# Patient Record
Sex: MALE | Race: WHITE | NOT HISPANIC OR LATINO
[De-identification: names, ages, dates, MRNs, and addresses within clinical notes are randomized per-mention and may not be internally consistent; named-entity substitution may affect disease eponyms.]

---

## 2017-01-04 ENCOUNTER — RX RENEWAL (OUTPATIENT)
Age: 67
End: 2017-01-04

## 2017-01-18 ENCOUNTER — APPOINTMENT (OUTPATIENT)
Dept: HEART AND VASCULAR | Facility: CLINIC | Age: 67
End: 2017-01-18

## 2017-01-18 VITALS
SYSTOLIC BLOOD PRESSURE: 98 MMHG | HEART RATE: 66 BPM | OXYGEN SATURATION: 95 % | DIASTOLIC BLOOD PRESSURE: 71 MMHG | RESPIRATION RATE: 15 BRPM

## 2018-02-08 ENCOUNTER — MEDICATION RENEWAL (OUTPATIENT)
Age: 68
End: 2018-02-08

## 2018-03-07 ENCOUNTER — APPOINTMENT (OUTPATIENT)
Dept: HEART AND VASCULAR | Facility: CLINIC | Age: 68
End: 2018-03-07

## 2018-12-12 ENCOUNTER — APPOINTMENT (OUTPATIENT)
Dept: HEART AND VASCULAR | Facility: CLINIC | Age: 68
End: 2018-12-12
Payer: COMMERCIAL

## 2018-12-12 VITALS
BODY MASS INDEX: 25.39 KG/M2 | WEIGHT: 215 LBS | DIASTOLIC BLOOD PRESSURE: 72 MMHG | HEIGHT: 77 IN | SYSTOLIC BLOOD PRESSURE: 116 MMHG | OXYGEN SATURATION: 98 % | HEART RATE: 59 BPM

## 2018-12-12 PROCEDURE — 99214 OFFICE O/P EST MOD 30 MIN: CPT

## 2018-12-12 NOTE — PHYSICAL EXAM
[General Appearance - Well Developed] : well developed [Normal Appearance] : normal appearance [Well Groomed] : well groomed [General Appearance - Well Nourished] : well nourished [No Deformities] : no deformities [General Appearance - In No Acute Distress] : no acute distress [Normal Conjunctiva] : the conjunctiva exhibited no abnormalities [Eyelids - No Xanthelasma] : the eyelids demonstrated no xanthelasmas [Normal Oral Mucosa] : normal oral mucosa [No Oral Pallor] : no oral pallor [No Oral Cyanosis] : no oral cyanosis [Normal Jugular Venous A Waves Present] : normal jugular venous A waves present [Normal Jugular Venous V Waves Present] : normal jugular venous V waves present [No Jugular Venous Amin A Waves] : no jugular venous amin A waves [Heart Rate And Rhythm] : heart rate and rhythm were normal [Heart Sounds] : normal S1 and S2 [Murmurs] : no murmurs present [Abdomen Soft] : soft [Abdomen Tenderness] : non-tender [Abdomen Mass (___ Cm)] : no abdominal mass palpated [Abnormal Walk] : normal gait [Gait - Sufficient For Exercise Testing] : the gait was sufficient for exercise testing [Nail Clubbing] : no clubbing of the fingernails [Cyanosis, Localized] : no localized cyanosis [Petechial Hemorrhages (___cm)] : no petechial hemorrhages [Skin Color & Pigmentation] : normal skin color and pigmentation [] : no rash [No Venous Stasis] : no venous stasis [Skin Lesions] : no skin lesions [No Skin Ulcers] : no skin ulcer [No Xanthoma] : no  xanthoma was observed [Oriented To Time, Place, And Person] : oriented to person, place, and time [Affect] : the affect was normal [Mood] : the mood was normal [No Anxiety] : not feeling anxious

## 2019-02-06 ENCOUNTER — APPOINTMENT (OUTPATIENT)
Dept: HEART AND VASCULAR | Facility: CLINIC | Age: 69
End: 2019-02-06
Payer: COMMERCIAL

## 2019-02-06 VITALS
SYSTOLIC BLOOD PRESSURE: 113 MMHG | WEIGHT: 216 LBS | HEART RATE: 55 BPM | OXYGEN SATURATION: 99 % | BODY MASS INDEX: 25.5 KG/M2 | HEIGHT: 77 IN | DIASTOLIC BLOOD PRESSURE: 70 MMHG

## 2019-02-06 PROCEDURE — 99214 OFFICE O/P EST MOD 30 MIN: CPT

## 2019-02-06 NOTE — PHYSICAL EXAM
[General Appearance - Well Developed] : well developed [Normal Appearance] : normal appearance [Well Groomed] : well groomed [General Appearance - Well Nourished] : well nourished [No Deformities] : no deformities [General Appearance - In No Acute Distress] : no acute distress [Normal Conjunctiva] : the conjunctiva exhibited no abnormalities [Eyelids - No Xanthelasma] : the eyelids demonstrated no xanthelasmas [Normal Oral Mucosa] : normal oral mucosa [No Oral Pallor] : no oral pallor [No Oral Cyanosis] : no oral cyanosis [Normal Jugular Venous A Waves Present] : normal jugular venous A waves present [Normal Jugular Venous V Waves Present] : normal jugular venous V waves present [No Jugular Venous Amin A Waves] : no jugular venous amin A waves [Respiration, Rhythm And Depth] : normal respiratory rhythm and effort [Exaggerated Use Of Accessory Muscles For Inspiration] : no accessory muscle use [Auscultation Breath Sounds / Voice Sounds] : lungs were clear to auscultation bilaterally [Heart Rate And Rhythm] : heart rate and rhythm were normal [Heart Sounds] : normal S1 and S2 [Murmurs] : no murmurs present [Abdomen Soft] : soft [Abdomen Tenderness] : non-tender [] : no hepato-splenomegaly [Abdomen Mass (___ Cm)] : no abdominal mass palpated [Abnormal Walk] : normal gait [Gait - Sufficient For Exercise Testing] : the gait was sufficient for exercise testing [Oriented To Time, Place, And Person] : oriented to person, place, and time [Affect] : the affect was normal [Mood] : the mood was normal [No Anxiety] : not feeling anxious

## 2019-02-22 ENCOUNTER — RX RENEWAL (OUTPATIENT)
Age: 69
End: 2019-02-22

## 2019-04-17 ENCOUNTER — RX CHANGE (OUTPATIENT)
Age: 69
End: 2019-04-17

## 2019-05-09 ENCOUNTER — LABORATORY RESULT (OUTPATIENT)
Age: 69
End: 2019-05-09

## 2019-05-09 ENCOUNTER — APPOINTMENT (OUTPATIENT)
Dept: HEMATOLOGY ONCOLOGY | Facility: CLINIC | Age: 69
End: 2019-05-09
Payer: COMMERCIAL

## 2019-05-09 VITALS
HEIGHT: 77 IN | TEMPERATURE: 98.3 F | DIASTOLIC BLOOD PRESSURE: 70 MMHG | SYSTOLIC BLOOD PRESSURE: 116 MMHG | BODY MASS INDEX: 24.79 KG/M2 | WEIGHT: 210 LBS | OXYGEN SATURATION: 98 % | HEART RATE: 81 BPM

## 2019-05-09 DIAGNOSIS — Z85.46 PERSONAL HISTORY OF MALIGNANT NEOPLASM OF PROSTATE: ICD-10-CM

## 2019-05-09 DIAGNOSIS — Z80.42 FAMILY HISTORY OF MALIGNANT NEOPLASM OF PROSTATE: ICD-10-CM

## 2019-05-09 DIAGNOSIS — Z82.49 FAMILY HISTORY OF ISCHEMIC HEART DISEASE AND OTHER DISEASES OF THE CIRCULATORY SYSTEM: ICD-10-CM

## 2019-05-09 DIAGNOSIS — Z80.3 FAMILY HISTORY OF MALIGNANT NEOPLASM OF BREAST: ICD-10-CM

## 2019-05-09 DIAGNOSIS — Z82.3 FAMILY HISTORY OF STROKE: ICD-10-CM

## 2019-05-09 DIAGNOSIS — Z82.0 FAMILY HISTORY OF EPILEPSY AND OTHER DISEASES OF THE NERVOUS SYSTEM: ICD-10-CM

## 2019-05-09 LAB
APTT BLD: 39 SEC
BASOPHILS # BLD AUTO: 0.06 K/UL
BASOPHILS NFR BLD AUTO: 0.9 %
EOSINOPHIL # BLD AUTO: 0.04 K/UL
EOSINOPHIL NFR BLD AUTO: 0.6 %
HCT VFR BLD CALC: 42 %
HGB BLD-MCNC: 13.3 G/DL
IMM GRANULOCYTES NFR BLD AUTO: 0.4 %
INR PPP: 1.61
LYMPHOCYTES # BLD AUTO: 1.3 K/UL
LYMPHOCYTES NFR BLD AUTO: 18.8 %
MAN DIFF?: NORMAL
MCHC RBC-ENTMCNC: 29.9 PG
MCHC RBC-ENTMCNC: 31.7 GM/DL
MCV RBC AUTO: 94.4 FL
MONOCYTES # BLD AUTO: 0.48 K/UL
MONOCYTES NFR BLD AUTO: 7 %
NEUTROPHILS # BLD AUTO: 4.99 K/UL
NEUTROPHILS NFR BLD AUTO: 72.3 %
PLATELET # BLD AUTO: 231 K/UL
PT BLD: 18.5 SEC
RBC # BLD: 4.45 M/UL
RBC # FLD: 13.7 %
WBC # FLD AUTO: 6.9 K/UL

## 2019-05-09 PROCEDURE — 36415 COLL VENOUS BLD VENIPUNCTURE: CPT

## 2019-05-09 PROCEDURE — 99244 OFF/OP CNSLTJ NEW/EST MOD 40: CPT | Mod: 25

## 2019-05-09 NOTE — REASON FOR VISIT
[Initial Consultation] : an initial consultation for [FreeTextEntry2] : History of DVT, Factor V Leiden mutation

## 2019-05-09 NOTE — ASSESSMENT
[FreeTextEntry1] : Patient is a 68 year old male with a history of aortic aneurysm, GERD, hyperlipidemia, prostate cancer in 2005, s/p surgical prostatectomy, Factor V Leiden mutation, and DVT of the left lower extremity, who comes for hematologic consultation regarding the need for continued anticoagulation. Patient has a strong family history of thrombosis and is documented to be heterozygous for Factor V Leiden mutation.  Have ordered CBC, INR, PTT, Lupus anticoagulant, DRVVT, cardiolipins panel, beta 2 glycoprotein 1 antibodies panel, homocysteine, protein C activity, protein S panel. Patient was advised to return to office discuss results.

## 2019-05-09 NOTE — HISTORY OF PRESENT ILLNESS
[de-identified] : Patient is a 68 year old male with a history of aortic aneurysm, GERD, hyperlipidemia, prostate cancer in 2005, s/p surgical prostatectomy, Factor V Leiden mutation, and DVT of the left lower extremity, who comes for hematologic consultation. In 2012 he developed a left lower extremity DVT, and was found at the time to be heterozygous for Factor V Leiden mutation, and negative for prothrombin gene mutation. The DVT was treated initially with Heparin and then Coumadin. Symptoms worsened while on Coumadin, and he was switched over to Enoxaparin injections, which he remained on for 6 years until his hematologist transitioned him over to Xarelto in January 2019. Family history is significant for thrombotic events in the patient's father, who was on Coumadin for many years, as well as his paternal aunt, uncle, and cousin. Of note, patient travelled frequently in the past, and although less often, still travels for lengthy periods on occasion. He states he is feeling generally well. Denies excessive bruising, any bleeding, fever, chills, or sweats.

## 2019-05-09 NOTE — CONSULT LETTER
[Dear  ___] : Dear  [unfilled], [Consult Letter:] : I had the pleasure of evaluating your patient, [unfilled]. [Please see my note below.] : Please see my note below. [( Thank you for referring [unfilled] for consultation for _____ )] : Thank you for referring [unfilled] for consultation for [unfilled] [Sincerely,] : Sincerely, [FreeTextEntry3] : Galilea Lantigua

## 2019-05-09 NOTE — END OF VISIT
[FreeTextEntry3] : All medical record entries made by the Scribe were at my, Dr. Galilea Lantigua, direction and personally dictated by me on 05/09/2019. I have reviewed the chart and agree that the record accurately reflects my personal performance of the history, physical exam, assessment and plan. I have also personally directed, reviewed, and agreed with the chart.

## 2019-05-09 NOTE — ADDENDUM
[FreeTextEntry1] : I, Kings Miranda, acted solely as a scribe for Dr. Galilea Lantigua on 05/09/2019.

## 2019-05-10 ENCOUNTER — LABORATORY RESULT (OUTPATIENT)
Age: 69
End: 2019-05-10

## 2019-05-10 ENCOUNTER — OTHER (OUTPATIENT)
Age: 69
End: 2019-05-10

## 2019-05-10 LAB
APTT HEX PL PPP: NEGATIVE
CARDIOLIPIN AB SER IA-ACNC: NEGATIVE
CONFIRM: 56.3 SEC
DRVVT IMM 1:2 NP PPP: ABNORMAL
DRVVT SCREEN TO CONFIRM RATIO: 1.1 RATIO
HCYS SERPL-MCNC: 18.4 UMOL/L
PROT C PPP CHRO-ACNC: 135 %
PROT S AG ACT/NOR PPP IA: 112 %
SCREEN DRVVT: 61.8 SEC

## 2019-05-13 LAB
B2 GLYCOPROT1 IGA SERPL IA-ACNC: <5 SAU
B2 GLYCOPROT1 IGG SER-ACNC: <5 SGU
B2 GLYCOPROT1 IGM SER-ACNC: <5 SMU
CARDIOLIPIN IGM SER-MCNC: 5.4 MPL
CARDIOLIPIN IGM SER-MCNC: <5 GPL
DEPRECATED CARDIOLIPIN IGA SER: <5 APL

## 2019-05-16 LAB — PROT S FREE PPP-ACNC: 140 % NORMAL

## 2019-06-04 ENCOUNTER — APPOINTMENT (OUTPATIENT)
Dept: HEMATOLOGY ONCOLOGY | Facility: CLINIC | Age: 69
End: 2019-06-04
Payer: COMMERCIAL

## 2019-06-04 VITALS
HEIGHT: 77 IN | BODY MASS INDEX: 24.79 KG/M2 | WEIGHT: 210 LBS | SYSTOLIC BLOOD PRESSURE: 110 MMHG | HEART RATE: 64 BPM | TEMPERATURE: 98.2 F | DIASTOLIC BLOOD PRESSURE: 60 MMHG | OXYGEN SATURATION: 98 %

## 2019-06-04 VITALS
SYSTOLIC BLOOD PRESSURE: 110 MMHG | BODY MASS INDEX: 24.79 KG/M2 | DIASTOLIC BLOOD PRESSURE: 60 MMHG | HEIGHT: 77 IN | WEIGHT: 210 LBS | OXYGEN SATURATION: 98 % | TEMPERATURE: 98.2 F | HEART RATE: 64 BPM

## 2019-06-04 PROCEDURE — 99212 OFFICE O/P EST SF 10 MIN: CPT

## 2019-06-04 NOTE — END OF VISIT
[FreeTextEntry3] : All medical record entries made by the Scribe were at my, Dr. Galilea Lantigua, direction and personally dictated by me on 06/04/2019. I have reviewed the chart and agree that the record accurately reflects my personal performance of the history, physical exam, assessment and plan. I have also personally directed, reviewed, and agreed with the chart.

## 2019-06-04 NOTE — ASSESSMENT
[FreeTextEntry1] : Patient is a 68 year old male with a history of aortic aneurysm, GERD, hyperlipidemia, prostate cancer in 2005, s/p surgical prostatectomy, Factor V Leiden mutation, and DVT of the left lower extremity, who comes for discussion of results. Patient has a strong family history of thrombosis and is documented to be heterozygous for Factor V Leiden mutation. Blood studies for hypercoagulable state were all negative or normal, with the exception of the positive lupus anticoagulant. Have advised that he remain on Xarelto for the time, and continued anticoagulation in the future will likely be required. Of note, patient has previously attempted Eliquis in the past, and subsequently developed a generalized rash. Have recommended that he also take OTC folic acid QD in view of his increased homocysteine. Have ordered B12 and folate, methylmalonic acid level, and molecular genetics MTHFR gene. He was made aware that there may exist thrombophilic states that, at this point in time, we do not have the capability for which to test. Patient was advised to call office to discuss results and next appointment date.

## 2019-06-04 NOTE — REASON FOR VISIT
[Follow-Up Visit] : a follow-up visit for [FreeTextEntry2] : Factor V Leiden Mutation, History of DVT, Hypercoagulable State

## 2019-06-04 NOTE — HISTORY OF PRESENT ILLNESS
[de-identified] : Patient is a 68 year old male with a history of aortic aneurysm, GERD, hyperlipidemia, prostate cancer in 2005, s/p surgical prostatectomy, Factor V Leiden mutation, and DVT of the left lower extremity, who comes for discussion of results. Patient had been on Enoxaparin injections for 6 years until his hematologist transitioned him over to Xarelto in January 2019. At the initial visit, patient's blood results revealed an increased homocysteine at 18.4. Blood studies for hypercoagulable state were all negative or normal, with the exception of the lupus anticoagulant which was positive. Family history is significant for thrombotic events in the patient's father, who was on Coumadin for many years, as well as his paternal aunt, uncle, and cousin. Patient is tolerating Xarelto. Previously tried Eliquis resulted in a diffuse rash. Patient states he is feeling generally well. Denies excessive bruising, bleeding, fever, chills, or sweats.

## 2019-06-04 NOTE — ADDENDUM
[FreeTextEntry1] : I, Kings Miranda, acted solely as a scribe for Dr. Galilea Lantigua on 06/04/2019.

## 2019-06-05 LAB
FOLATE SERPL-MCNC: 8.3 NG/ML
VIT B12 SERPL-MCNC: 301 PG/ML

## 2019-06-07 LAB — METHYLMALONATE SERPL-SCNC: 300 NMOL/L

## 2019-06-20 ENCOUNTER — OTHER (OUTPATIENT)
Age: 69
End: 2019-06-20

## 2019-07-17 ENCOUNTER — APPOINTMENT (OUTPATIENT)
Dept: HEART AND VASCULAR | Facility: CLINIC | Age: 69
End: 2019-07-17

## 2019-07-31 ENCOUNTER — APPOINTMENT (OUTPATIENT)
Dept: HEMATOLOGY ONCOLOGY | Facility: CLINIC | Age: 69
End: 2019-07-31

## 2020-01-09 ENCOUNTER — APPOINTMENT (OUTPATIENT)
Dept: HEMATOLOGY ONCOLOGY | Facility: CLINIC | Age: 70
End: 2020-01-09
Payer: COMMERCIAL

## 2020-01-09 VITALS
BODY MASS INDEX: 25.74 KG/M2 | DIASTOLIC BLOOD PRESSURE: 60 MMHG | OXYGEN SATURATION: 97 % | TEMPERATURE: 98.2 F | SYSTOLIC BLOOD PRESSURE: 110 MMHG | HEIGHT: 77 IN | WEIGHT: 218 LBS | HEART RATE: 53 BPM

## 2020-01-09 PROCEDURE — 99214 OFFICE O/P EST MOD 30 MIN: CPT | Mod: 25

## 2020-01-09 PROCEDURE — 36415 COLL VENOUS BLD VENIPUNCTURE: CPT

## 2020-01-09 NOTE — REASON FOR VISIT
[Follow-Up Visit] : a follow-up visit for [FreeTextEntry2] : History of DVT, Factor V Leiden Mutation, Hyperhomocystinemia, Positive Lupus anticoagulant, hypercoagulable state

## 2020-01-09 NOTE — HISTORY OF PRESENT ILLNESS
[de-identified] : Patient is a 69 year old male with a history of aortic aneurysm, GERD, hyperlipidemia, prostate cancer in 2005, s/p surgical prostatectomy, Factor V Leiden mutation, and DVT of the left lower extremity, who now presents for hematologic follow-up. Patient had been on Enoxaparin injections for 6 years (states that he was Coumadin resistant and had extension of clotting while on Coumadin), until his prior hematologist transitioned him over to Xarelto in January 2019. Patient is tolerating Xarelto. Patient states he is feeling generally well at this time, with no acute complaints. Denies excessive bruising, epistaxis, gingival bleeding, hematochezia, hematuria, fever, chills, or sweats. Of note, patient declined an influenza vaccination at today's visit.

## 2020-01-09 NOTE — END OF VISIT
[FreeTextEntry3] : All medical record entries made by the Scribe were at my, Dr. Galilea Lantigua, direction and personally dictated by me on 01/09/2020. I have reviewed the chart and agree that the record accurately reflects my personal performance of the history, physical exam, assessment and plan. I have also personally directed, reviewed, and agreed with the chart.

## 2020-01-09 NOTE — ADDENDUM
[FreeTextEntry1] : I, Uday Casper, acted solely as a scribe for Dr. Galilea Lantigua on 01/09/2020.

## 2020-01-09 NOTE — ASSESSMENT
[FreeTextEntry1] : Patient is a 69 year old male with a history of aortic aneurysm, GERD, hyperlipidemia, prostate cancer in 2005, s/p surgical prostatectomy, Factor V Leiden mutation, and DVT of the left lower extremity, who now presents for hematologic follow-up. Patient has a strong family history of thrombosis and has lupus anticoagulant and Factor V Leiden mutation. He has history of resistance to Coumadin and remains now on Xarelto. Have ordered CBC, CMP, B12 and folate, and homocysteine serum. Patient was advised to call office to discuss results and next appointment date.

## 2020-01-10 ENCOUNTER — LABORATORY RESULT (OUTPATIENT)
Age: 70
End: 2020-01-10

## 2020-01-10 LAB
ALBUMIN SERPL ELPH-MCNC: 4.4 G/DL
ALP BLD-CCNC: 76 U/L
ALT SERPL-CCNC: 12 U/L
ANION GAP SERPL CALC-SCNC: 13 MMOL/L
AST SERPL-CCNC: 20 U/L
BASOPHILS # BLD AUTO: 0.09 K/UL
BASOPHILS NFR BLD AUTO: 1.6 %
BILIRUB SERPL-MCNC: 0.5 MG/DL
BUN SERPL-MCNC: 29 MG/DL
CALCIUM SERPL-MCNC: 9.6 MG/DL
CHLORIDE SERPL-SCNC: 105 MMOL/L
CO2 SERPL-SCNC: 24 MMOL/L
CREAT SERPL-MCNC: 1.66 MG/DL
EOSINOPHIL # BLD AUTO: 0.08 K/UL
EOSINOPHIL NFR BLD AUTO: 1.4 %
FOLATE SERPL-MCNC: 19.5 NG/ML
GLUCOSE SERPL-MCNC: 118 MG/DL
HCT VFR BLD CALC: 42.9 %
HCYS SERPL-MCNC: 17.2 UMOL/L
HGB BLD-MCNC: 13.3 G/DL
IMM GRANULOCYTES NFR BLD AUTO: 0.4 %
LYMPHOCYTES # BLD AUTO: 1.45 K/UL
LYMPHOCYTES NFR BLD AUTO: 26.3 %
MAN DIFF?: NORMAL
MCHC RBC-ENTMCNC: 30 PG
MCHC RBC-ENTMCNC: 31 GM/DL
MCV RBC AUTO: 96.6 FL
MONOCYTES # BLD AUTO: 0.33 K/UL
MONOCYTES NFR BLD AUTO: 6 %
NEUTROPHILS # BLD AUTO: 3.55 K/UL
NEUTROPHILS NFR BLD AUTO: 64.3 %
PLATELET # BLD AUTO: 177 K/UL
POTASSIUM SERPL-SCNC: 4.3 MMOL/L
PROT SERPL-MCNC: 6.6 G/DL
RBC # BLD: 4.44 M/UL
RBC # FLD: 13.6 %
SODIUM SERPL-SCNC: 142 MMOL/L
VIT B12 SERPL-MCNC: 363 PG/ML
WBC # FLD AUTO: 5.52 K/UL

## 2020-01-22 ENCOUNTER — APPOINTMENT (OUTPATIENT)
Dept: OTOLARYNGOLOGY | Facility: CLINIC | Age: 70
End: 2020-01-22
Payer: COMMERCIAL

## 2020-01-22 VITALS
HEIGHT: 77 IN | DIASTOLIC BLOOD PRESSURE: 76 MMHG | BODY MASS INDEX: 25.62 KG/M2 | HEART RATE: 64 BPM | WEIGHT: 217 LBS | SYSTOLIC BLOOD PRESSURE: 121 MMHG

## 2020-01-22 PROCEDURE — 99213 OFFICE O/P EST LOW 20 MIN: CPT | Mod: 25

## 2020-01-22 PROCEDURE — 69210 REMOVE IMPACTED EAR WAX UNI: CPT

## 2020-01-22 NOTE — CONSULT LETTER
[FreeTextEntry2] : LATIA CHUNG\par  [FreeTextEntry1] : \par \par Dear  Dr. LATIA CHUNG,\par \par I had the pleasure of seeing your patient today.  \par Please see my note below.\par \par \par Thank you very much for allowing me to participate in the care of your patient.\par \par Sincerely,\par \par \par Anderson Mercedes MD\par [facility]\par [facility]\par  [facility]\par \par

## 2020-01-22 NOTE — PHYSICAL EXAM
[FreeTextEntry1] : \par The patient was alert and oriented and in no distress.\par Voice was clear.\par \par Face:\par The patient had no facial asymmetry or mass.\par The skin was unremarkable.\par \par Eyes:\par The pupils were equal round and reactive to light and accommodation.\par There was no significant nystagmus or disconjugate gaze noted.\par \par Nose: \par The external nose had no significant deformity.  There was no facial tenderness.  On anterior rhinoscopy, the nasal mucosa was clear.  The anterior septum was midline.  There were no visualized polyps purulence  or masses.\par \par Oral cavity:\par The oral mucosa was normal.\par The oral and base of tongue were clear and without mass.\par The gingival and buccal mucosa were moist and without lesions.\par The palate moved well.\par There was no cleft to the palate.\par There appeared to be good salivary flow.  \par There was no pus, erythema or mass in the oral cavity.\par \par Ears:\par Procedure note: Debridement of cerumen impaction left ear   59908\par \par Dx:  symptomatic cerumen impaction left ear \par Both external ears were normal.\par There was a dense cerumen impaction in the left ear.  This was cleared microscopically without trauma, using suction and curettes.  Beyond that, both ear canals were clear and both eardrums were intact and mobile.\par \par Neck: \par The neck was symmetrical.\par The parotid and submandibular glands were normal without masses.\par The trachea was midline and there was no unusual crepitus.\par The thyroid was smooth and nontender and no masses were palpated.\par There was no significant cervical adenopathy.\par \par \par Neuro:\par Neurologically, the patient was awake, alert, and oriented to person, place and time. There were no obvious focal neurologic abnormalities.  Cranial nerves II through XII were grossly intact.\par \par \par TMJ:\par The temporomandibular joints were nontender.\par There was no abnormal crepitus and no significant malocclusion\par

## 2020-01-22 NOTE — HISTORY OF PRESENT ILLNESS
[de-identified] : YUE HENSON is a 69 year old male who comes in complaining of A cerumen impaction in his left ear. I had seen him in the past with cerumen impactions tinnitus and also barotrauma. 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

## 2020-01-22 NOTE — ASSESSMENT
[FreeTextEntry1] : It was my impression that the patient had a cerumen impaction that was cleared.  I recommended topical moisturizing.  I recommended avoiding Q-tips.  I reviewed aural hygiene and the role of cerumen with the patient.  I suggested a repeat visit in a year or earlier should the need arise.\par He has a history of tinnitus but is not bothering him now.\par He feels his hearing was good and was not interested in having it rechecked today

## 2020-04-08 ENCOUNTER — APPOINTMENT (OUTPATIENT)
Dept: HEMATOLOGY ONCOLOGY | Facility: CLINIC | Age: 70
End: 2020-04-08

## 2020-07-21 ENCOUNTER — APPOINTMENT (OUTPATIENT)
Dept: HEMATOLOGY ONCOLOGY | Facility: CLINIC | Age: 70
End: 2020-07-21

## 2020-07-22 ENCOUNTER — APPOINTMENT (OUTPATIENT)
Dept: HEMATOLOGY ONCOLOGY | Facility: CLINIC | Age: 70
End: 2020-07-22
Payer: COMMERCIAL

## 2020-07-22 VITALS
OXYGEN SATURATION: 98 % | SYSTOLIC BLOOD PRESSURE: 118 MMHG | HEIGHT: 77 IN | HEART RATE: 81 BPM | DIASTOLIC BLOOD PRESSURE: 70 MMHG | WEIGHT: 223 LBS | BODY MASS INDEX: 26.33 KG/M2 | TEMPERATURE: 96.7 F

## 2020-07-22 DIAGNOSIS — Z11.59 ENCOUNTER FOR SCREENING FOR OTHER VIRAL DISEASES: ICD-10-CM

## 2020-07-22 PROCEDURE — 36415 COLL VENOUS BLD VENIPUNCTURE: CPT

## 2020-07-22 PROCEDURE — 99214 OFFICE O/P EST MOD 30 MIN: CPT | Mod: 25

## 2020-07-22 NOTE — REVIEW OF SYSTEMS
[Negative] : Allergic/Immunologic [Easy Bleeding] : no tendency for easy bleeding [Easy Bruising] : no tendency for easy bruising

## 2020-07-22 NOTE — REASON FOR VISIT
[Follow-Up Visit] : a follow-up visit for [FreeTextEntry2] : Hypercoagulable state, Factor V Leiden Mutation, Positive lupus anticoagulant, elevated homocysteine level, History of DVT, B12 deficiency

## 2020-07-22 NOTE — HISTORY OF PRESENT ILLNESS
[de-identified] : \par Patient is a 69 year old male with a history of aortic aneurysm, GERD, hyperlipidemia, prostate cancer in 2005, s/p surgical prostatectomy, Factor V Leiden mutation, and DVT of the left lower extremity, elevated homocysteine level, B12 deficiency  who now presents for hematologic follow-up. Patient had been on Enoxaparin injections for 6 years (states that he was Coumadin resistant and had extension of clotting while on Coumadin), until his prior hematologist transitioned him over to Xarelto in January 2019. Patient is tolerating Xarelto. Patient states he is feeling generally well at this time, with no acute complaints.Had some transient gum bleeding in early March and states a dental exam was unrevealing. Has small ecchymosis on toe from trauma. Denies excessive bruising, epistaxis, hematochezia, hematuria, fever, chills, or sweats. He has taken sublingual B12 and folic acid since last visit for low B12 and high homocysteine.

## 2020-07-22 NOTE — ASSESSMENT
[FreeTextEntry1] : \par Patient is a 69 year old male with a history of aortic aneurysm, GERD, hyperlipidemia, prostate cancer in 2005, s/p surgical prostatectomy, Factor V Leiden mutation, and DVT of the left lower extremity, elevated homocysteine level, B12 deficiency  who now presents for hematologic follow-up. Tolerating Xarelto without significant bleeding or bruising. Is deficient in B12 , has elevated homocysteine and has been taking supplements.\par Will order CBC, CMP, B12, folate, Homocysteine level and Covid-19 antibody (patient's request). To continue supplements. Patient advised to call next week to discuss results and next appointment date.

## 2020-07-22 NOTE — PHYSICAL EXAM
[Normal] : affect appropriate [de-identified] : small ecchymosis left third toe. [de-identified] : varicosities in the lower extremities

## 2020-07-23 ENCOUNTER — LABORATORY RESULT (OUTPATIENT)
Age: 70
End: 2020-07-23

## 2020-07-23 LAB
ALBUMIN SERPL ELPH-MCNC: 4.9 G/DL
ALP BLD-CCNC: 95 U/L
ALT SERPL-CCNC: 14 U/L
ANION GAP SERPL CALC-SCNC: 15 MMOL/L
AST SERPL-CCNC: 19 U/L
BASOPHILS # BLD AUTO: 0.07 K/UL
BASOPHILS NFR BLD AUTO: 1.2 %
BILIRUB SERPL-MCNC: 0.5 MG/DL
BUN SERPL-MCNC: 25 MG/DL
CALCIUM SERPL-MCNC: 9.9 MG/DL
CHLORIDE SERPL-SCNC: 101 MMOL/L
CO2 SERPL-SCNC: 23 MMOL/L
CREAT SERPL-MCNC: 1.7 MG/DL
EOSINOPHIL # BLD AUTO: 0.07 K/UL
EOSINOPHIL NFR BLD AUTO: 1.2 %
FOLATE SERPL-MCNC: >20 NG/ML
GLUCOSE SERPL-MCNC: 154 MG/DL
HCT VFR BLD CALC: 44.4 %
HGB BLD-MCNC: 14.2 G/DL
IMM GRANULOCYTES NFR BLD AUTO: 0.4 %
LYMPHOCYTES # BLD AUTO: 1.18 K/UL
LYMPHOCYTES NFR BLD AUTO: 21 %
MAN DIFF?: NORMAL
MCHC RBC-ENTMCNC: 30.2 PG
MCHC RBC-ENTMCNC: 32 GM/DL
MCV RBC AUTO: 94.5 FL
MONOCYTES # BLD AUTO: 0.38 K/UL
MONOCYTES NFR BLD AUTO: 6.8 %
NEUTROPHILS # BLD AUTO: 3.89 K/UL
NEUTROPHILS NFR BLD AUTO: 69.4 %
PLATELET # BLD AUTO: 181 K/UL
POTASSIUM SERPL-SCNC: 4.4 MMOL/L
PROT SERPL-MCNC: 7.2 G/DL
RBC # BLD: 4.7 M/UL
RBC # FLD: 13.9 %
SODIUM SERPL-SCNC: 139 MMOL/L
VIT B12 SERPL-MCNC: 485 PG/ML
WBC # FLD AUTO: 5.61 K/UL

## 2020-07-24 LAB — HCYS SERPL-MCNC: 16.7 UMOL/L

## 2020-07-29 LAB
SARS-COV-2 IGG SERPL IA-ACNC: <0.1 INDEX
SARS-COV-2 IGG SERPL QL IA: NEGATIVE

## 2020-12-21 ENCOUNTER — RX RENEWAL (OUTPATIENT)
Age: 70
End: 2020-12-21

## 2021-01-13 ENCOUNTER — NON-APPOINTMENT (OUTPATIENT)
Age: 71
End: 2021-01-13

## 2021-03-01 ENCOUNTER — RX RENEWAL (OUTPATIENT)
Age: 71
End: 2021-03-01

## 2021-03-08 ENCOUNTER — APPOINTMENT (OUTPATIENT)
Dept: OTOLARYNGOLOGY | Facility: CLINIC | Age: 71
End: 2021-03-08
Payer: COMMERCIAL

## 2021-03-08 VITALS — TEMPERATURE: 95.7 F | BODY MASS INDEX: 26.33 KG/M2 | HEIGHT: 77 IN | WEIGHT: 223 LBS

## 2021-03-08 PROCEDURE — 99213 OFFICE O/P EST LOW 20 MIN: CPT | Mod: 25

## 2021-03-08 PROCEDURE — 99072 ADDL SUPL MATRL&STAF TM PHE: CPT

## 2021-03-08 PROCEDURE — 69210 REMOVE IMPACTED EAR WAX UNI: CPT

## 2021-03-08 NOTE — CONSULT LETTER
[FreeTextEntry2] : LATIA CHUNG\par  [FreeTextEntry1] : \par \par Dear  Dr. LATIA CHUNG,\par \par I had the pleasure of seeing your patient today.  \par Please see my note below.\par \par \par Thank you very much for allowing me to participate in the care of your patient.\par \par Sincerely,\par \par \par Anderson Mercedes MD\par [facility]\par [facility]\par  [facility]\par \par

## 2021-03-08 NOTE — HISTORY OF PRESENT ILLNESS
[de-identified] : YUE HENSON is a 69 year old male who comes in complaining of A cerumen impaction in his left ear. I had seen him in the past with cerumen impactions tinnitus and also barotrauma. 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

## 2021-03-08 NOTE — ASSESSMENT
[FreeTextEntry1] : It was my impression that the patient had a cerumen impaction that was cleared.  I recommended topical moisturizing.  I recommended avoiding Q-tips.  I reviewed aural hygiene and the role of cerumen with the patient.  I suggested a repeat visit in a year or earlier should the need arise.\par He has a history of tinnitus but is not bothering him now.\par He feels his hearing was good and was not interested in having it rechecked today.  His last audiogram about a decade ago showed a mild snhl with slight asymmetry.

## 2021-05-06 ENCOUNTER — NON-APPOINTMENT (OUTPATIENT)
Age: 71
End: 2021-05-06

## 2021-05-07 ENCOUNTER — EMERGENCY (EMERGENCY)
Facility: HOSPITAL | Age: 71
LOS: 1 days | Discharge: HOME | End: 2021-05-07
Attending: EMERGENCY MEDICINE | Admitting: EMERGENCY MEDICINE
Payer: COMMERCIAL

## 2021-05-07 ENCOUNTER — NON-APPOINTMENT (OUTPATIENT)
Age: 71
End: 2021-05-07

## 2021-05-07 VITALS
TEMPERATURE: 98 F | DIASTOLIC BLOOD PRESSURE: 74 MMHG | HEART RATE: 65 BPM | RESPIRATION RATE: 18 BRPM | OXYGEN SATURATION: 98 % | SYSTOLIC BLOOD PRESSURE: 114 MMHG

## 2021-05-07 VITALS
RESPIRATION RATE: 18 BRPM | HEIGHT: 77 IN | DIASTOLIC BLOOD PRESSURE: 64 MMHG | WEIGHT: 218.04 LBS | HEART RATE: 64 BPM | TEMPERATURE: 98 F | OXYGEN SATURATION: 96 % | SYSTOLIC BLOOD PRESSURE: 101 MMHG

## 2021-05-07 DIAGNOSIS — Z79.01 LONG TERM (CURRENT) USE OF ANTICOAGULANTS: ICD-10-CM

## 2021-05-07 DIAGNOSIS — I82.402 ACUTE EMBOLISM AND THROMBOSIS OF UNSPECIFIED DEEP VEINS OF LEFT LOWER EXTREMITY: ICD-10-CM

## 2021-05-07 DIAGNOSIS — E78.5 HYPERLIPIDEMIA, UNSPECIFIED: ICD-10-CM

## 2021-05-07 DIAGNOSIS — I10 ESSENTIAL (PRIMARY) HYPERTENSION: ICD-10-CM

## 2021-05-07 LAB
ALBUMIN SERPL ELPH-MCNC: 4.2 G/DL — SIGNIFICANT CHANGE UP (ref 3.3–5)
ALP SERPL-CCNC: 85 U/L — SIGNIFICANT CHANGE UP (ref 40–120)
ALT FLD-CCNC: 10 U/L — SIGNIFICANT CHANGE UP (ref 10–45)
ANION GAP SERPL CALC-SCNC: 11 MMOL/L — SIGNIFICANT CHANGE UP (ref 5–17)
APTT BLD: 35.2 SEC — SIGNIFICANT CHANGE UP (ref 27.5–35.5)
AST SERPL-CCNC: 17 U/L — SIGNIFICANT CHANGE UP (ref 10–40)
BASOPHILS # BLD AUTO: 0.04 K/UL — SIGNIFICANT CHANGE UP (ref 0–0.2)
BASOPHILS NFR BLD AUTO: 0.7 % — SIGNIFICANT CHANGE UP (ref 0–2)
BILIRUB SERPL-MCNC: 0.4 MG/DL — SIGNIFICANT CHANGE UP (ref 0.2–1.2)
BUN SERPL-MCNC: 26 MG/DL — HIGH (ref 7–23)
CALCIUM SERPL-MCNC: 9.1 MG/DL — SIGNIFICANT CHANGE UP (ref 8.4–10.5)
CHLORIDE SERPL-SCNC: 105 MMOL/L — SIGNIFICANT CHANGE UP (ref 96–108)
CO2 SERPL-SCNC: 24 MMOL/L — SIGNIFICANT CHANGE UP (ref 22–31)
CREAT SERPL-MCNC: 1.57 MG/DL — HIGH (ref 0.5–1.3)
EOSINOPHIL # BLD AUTO: 0.04 K/UL — SIGNIFICANT CHANGE UP (ref 0–0.5)
EOSINOPHIL NFR BLD AUTO: 0.7 % — SIGNIFICANT CHANGE UP (ref 0–6)
GLUCOSE SERPL-MCNC: 105 MG/DL — HIGH (ref 70–99)
HCT VFR BLD CALC: 40.5 % — SIGNIFICANT CHANGE UP (ref 39–50)
HGB BLD-MCNC: 13 G/DL — SIGNIFICANT CHANGE UP (ref 13–17)
IMM GRANULOCYTES NFR BLD AUTO: 0.2 % — SIGNIFICANT CHANGE UP (ref 0–1.5)
INR BLD: 1.18 — HIGH (ref 0.88–1.16)
LYMPHOCYTES # BLD AUTO: 1.32 K/UL — SIGNIFICANT CHANGE UP (ref 1–3.3)
LYMPHOCYTES # BLD AUTO: 22.6 % — SIGNIFICANT CHANGE UP (ref 13–44)
MCHC RBC-ENTMCNC: 30.3 PG — SIGNIFICANT CHANGE UP (ref 27–34)
MCHC RBC-ENTMCNC: 32.1 GM/DL — SIGNIFICANT CHANGE UP (ref 32–36)
MCV RBC AUTO: 94.4 FL — SIGNIFICANT CHANGE UP (ref 80–100)
MONOCYTES # BLD AUTO: 0.37 K/UL — SIGNIFICANT CHANGE UP (ref 0–0.9)
MONOCYTES NFR BLD AUTO: 6.3 % — SIGNIFICANT CHANGE UP (ref 2–14)
NEUTROPHILS # BLD AUTO: 4.06 K/UL — SIGNIFICANT CHANGE UP (ref 1.8–7.4)
NEUTROPHILS NFR BLD AUTO: 69.5 % — SIGNIFICANT CHANGE UP (ref 43–77)
NRBC # BLD: 0 /100 WBCS — SIGNIFICANT CHANGE UP (ref 0–0)
PLATELET # BLD AUTO: 157 K/UL — SIGNIFICANT CHANGE UP (ref 150–400)
POTASSIUM SERPL-MCNC: 4.6 MMOL/L — SIGNIFICANT CHANGE UP (ref 3.5–5.3)
POTASSIUM SERPL-SCNC: 4.6 MMOL/L — SIGNIFICANT CHANGE UP (ref 3.5–5.3)
PROT SERPL-MCNC: 6.7 G/DL — SIGNIFICANT CHANGE UP (ref 6–8.3)
PROTHROM AB SERPL-ACNC: 14 SEC — HIGH (ref 10.6–13.6)
RBC # BLD: 4.29 M/UL — SIGNIFICANT CHANGE UP (ref 4.2–5.8)
RBC # FLD: 13.9 % — SIGNIFICANT CHANGE UP (ref 10.3–14.5)
SODIUM SERPL-SCNC: 140 MMOL/L — SIGNIFICANT CHANGE UP (ref 135–145)
WBC # BLD: 5.84 K/UL — SIGNIFICANT CHANGE UP (ref 3.8–10.5)
WBC # FLD AUTO: 5.84 K/UL — SIGNIFICANT CHANGE UP (ref 3.8–10.5)

## 2021-05-07 PROCEDURE — 99283 EMERGENCY DEPT VISIT LOW MDM: CPT

## 2021-05-07 PROCEDURE — 85025 COMPLETE CBC W/AUTO DIFF WBC: CPT

## 2021-05-07 PROCEDURE — 99284 EMERGENCY DEPT VISIT MOD MDM: CPT

## 2021-05-07 PROCEDURE — 80053 COMPREHEN METABOLIC PANEL: CPT

## 2021-05-07 PROCEDURE — 85610 PROTHROMBIN TIME: CPT

## 2021-05-07 PROCEDURE — 36415 COLL VENOUS BLD VENIPUNCTURE: CPT

## 2021-05-07 PROCEDURE — 85730 THROMBOPLASTIN TIME PARTIAL: CPT

## 2021-05-07 RX ORDER — ENOXAPARIN SODIUM 100 MG/ML
150 INJECTION SUBCUTANEOUS ONCE
Refills: 0 | Status: COMPLETED | OUTPATIENT
Start: 2021-05-07 | End: 2021-05-07

## 2021-05-07 RX ORDER — ENOXAPARIN SODIUM 100 MG/ML
150 INJECTION SUBCUTANEOUS
Qty: 14 | Refills: 0
Start: 2021-05-07 | End: 2021-05-20

## 2021-05-07 RX ORDER — APIXABAN 2.5 MG/1
0 TABLET, FILM COATED ORAL
Qty: 0 | Refills: 0 | DISCHARGE

## 2021-05-07 RX ADMIN — ENOXAPARIN SODIUM 150 MILLIGRAM(S): 100 INJECTION SUBCUTANEOUS at 16:48

## 2021-05-07 NOTE — ED PROVIDER NOTE - CLINICAL SUMMARY MEDICAL DECISION MAKING FREE TEXT BOX
71 y/o M w/ PMHx HTN, HLD, previous DVT in 2012, currently on xarelto, sent to the ED by his hematologist after new DVT found on outpt US. Will check labs, consult w/ Dr. Lantigua, and reassess. 69 y/o M w/ PMHx HTN, HLD, previous DVT in 2012, currently on xarelto, sent to the ED by his hematologist after new DVT found on outpt US. Will check labs, consult w/ Dr. Lantigua, and reassess.  Dr. Lantigua agrees on plan of restarting lovenox.  1.5mg/kg prescribed from Ed and for two weeks.  Will follow up as outpt with Dr. Lantigua.

## 2021-05-07 NOTE — ED PROVIDER NOTE - PATIENT PORTAL LINK FT
You can access the FollowMyHealth Patient Portal offered by [facility] by registering at the following website: [email or web address]. By joining [employer]’s FollowMyHealth portal, you will also be able to view your health information using other applications (apps) compatible with our system.

## 2021-05-07 NOTE — ED PROVIDER NOTE - NSFOLLOWUPINSTRUCTIONS_ED_ALL_ED_FT
Take lovenox as prescribed.    Follow up with your PMD and Dr. Lantigua next week.    Return to ED with worsening symptoms such as chest pain, shortness of breath, other concerns.          Deep Vein Thrombosis    WHAT YOU NEED TO KNOW:    Deep vein thrombosis (DVT) is a blood clot that forms in a deep vein of the body. The DVT can break into smaller pieces and travel to your lungs and cause a blockage called a pulmonary embolism. A PE can become life-threatening. It is important to go to all follow-up appointments and to take blood thinners as directed. This is especially important if you were discharged home from the emergency department.    Thrombus and Embolus         DISCHARGE INSTRUCTIONS:    Call your local emergency number (911 in the [place]) if:   •You feel lightheaded, short of breath, and have chest pain.      •You cough up blood.      Call your doctor if:   •Your arm or leg feels warm, tender, and painful. It may look swollen and red.      •You have questions or concerns about your condition or care.      Medicines:   •Blood thinners help prevent blood clots. Clots can cause strokes, heart attacks, and death. The following are general safety guidelines to follow while you are taking a blood thinner:?Watch for bleeding and bruising while you take blood thinners. Watch for bleeding from your gums or nose. Watch for blood in your urine and bowel movements. Use a soft washcloth on your skin, and a soft toothbrush to brush your teeth. This can keep your skin and gums from bleeding. If you shave, use an electric shaver. Do not play contact sports.       ?Tell your dentist and other healthcare providers that you take a blood thinner. Wear a bracelet or necklace that says you take this medicine.       ?Do not start or stop any other medicines unless your healthcare provider tells you to. Many medicines cannot be used with blood thinners.      ?Take your blood thinner exactly as prescribed by your healthcare provider. Do not skip does or take less than prescribed. Tell your provider right away if you forget to take your blood thinner, or if you take too much.      ?Warfarin is a blood thinner that you may need to take. The following are things you should be aware of if you take warfarin: ?Foods and medicines can affect the amount of warfarin in your blood. Do not make major changes to your diet while you take warfarin. Warfarin works best when you eat about the same amount of vitamin K every day. Vitamin K is found in green leafy vegetables and certain other foods. Ask for more information about what to eat when you are taking warfarin.      ?You will need to see your healthcare provider for follow-up visits when you are on warfarin. You will need regular blood tests. These tests are used to decide how much medicine you need.         •Take your medicine as directed. Contact your healthcare provider if you think your medicine is not helping or if you have side effects. Tell him or her if you are allergic to any medicine. Keep a list of the medicines, vitamins, and herbs you take. Include the amounts, and when and why you take them. Bring the list or the pill bottles to follow-up visits. Carry your medicine list with you in case of an emergency.      Manage a DVT:   •Wear pressure stockings as directed. The stockings put pressure on your legs. This improves blood flow and helps prevent clots. Wear the stockings during the day. Do not wear them when you sleep.  Pressure Stockings            •Elevate your legs above the level of your heart. Elevate your legs when you sit or lie down, as often as you can. This will help decrease swelling and pain. Prop your legs on pillows or blankets to keep them elevated comfortably.  Elevate Leg           Prevent a DVT:   •Exercise regularly to help increase your blood flow. Walking is a good low-impact exercise. Talk to your healthcare provider about the best exercise plan for you.   FAMILY WALKING FOR EXERCISE           •Change your body position or move around often. Move and stretch in your seat several times each hour if you travel by car or work at a desk. In an airplane, get up and walk every hour. Move your legs by tightening and releasing your leg muscles while sitting. You can move your legs while sitting by raising and lowering your heels. Keep your toes on the floor while you do this. You can also raise and lower your toes while keeping your heels on the floor.  DVT Prevention Heel Raise       DVT Prevention Toe Raise           •Maintain a healthy weight. Ask your healthcare provider what a healthy weight is for you. Ask him or her to help you create a weight loss plan if you are overweight.      •Do not smoke. Nicotine and other chemicals in cigarettes and cigars can damage blood vessels and make it more difficult to manage your DVT. Ask your healthcare provider for information if you currently smoke and need help to quit. E-cigarettes or smokeless tobacco still contain nicotine. Talk to your healthcare provider before you use these products.      •Ask about birth control if you are a woman who takes the pill. A birth control pill increases the risk for PE in certain women. The risk is higher if you are also older than 35, smoke cigarettes, or have a blood clotting disorder. Talk to your healthcare provider about other ways to prevent pregnancy, such as a cervical cap or intrauterine device (IUD).      Follow up with your doctor or specialist as directed: You may need to come in regularly for scans to check for blood clots. Your blood may be checked to see how long it takes to clot. Your doctor or specialist will tell you if you need to have this test and how often to have it. Write down your questions so you remember to ask them during your visits.       © Copyright [employer] 2021           back to top                          © Copyright [employer] 2021

## 2021-05-07 NOTE — ED PROVIDER NOTE - OBJECTIVE STATEMENT
71 y/o M with PMHx HTN, HLD, FHx of DVT, previous DVT in 2012, was on lovenox x 7 years, was switched to xarelto 20mg 1x a day for the past 2.5 years, sent in to the ED by his hematologist Dr. Collette Spaccavento for new DVT. Pt states he was seen by his podiatrist c/o swelling to his L foot as his foot felt tight in his shoe, but was told just to elevate the leg. Pt was then seen by his hematologist for a doppler, which showed a new L leg DVT and was sent here for further evaluation. Pt c/o mild swelling to the L ankle but states he feels otherwise feels well. Denies any calf pain, numbness, tingling, chest pain or SOB.

## 2021-05-07 NOTE — ED ADULT NURSE NOTE - OBJECTIVE STATEMENT
pt with confirmed left femoral DVT today , pt states that he noticed swelling in his left foot and was having some tightness in that area, hx DVT and on eliquis

## 2021-05-07 NOTE — ED PROVIDER NOTE - CARE PROVIDER_API CALL
Galilea Lantigua)  Hematology; Internal Medicine; Medical Oncology  [address], [place] [address]  Phone: [phone]  Fax: [phone]  Follow Up Time:

## 2021-05-07 NOTE — ED ADULT NURSE NOTE - NS PRO PASSIVE SMOKE EXP
OF SYSTEMS    (2-9 systems for level 4, 10 or more for level 5)     Review of Systems   Constitutional: Positive for fatigue. Negative for activity change, appetite change, chills, diaphoresis and fever. HENT: Negative for congestion, ear pain, postnasal drip, rhinorrhea, sinus pressure, sinus pain, sore throat and trouble swallowing. Eyes: Negative for visual disturbance. Respiratory: Negative for cough, chest tightness, shortness of breath and wheezing. Cardiovascular: Negative for chest pain and palpitations. Gastrointestinal: Negative for abdominal distention, abdominal pain, constipation, diarrhea, nausea and vomiting. Genitourinary: Negative for difficulty urinating, dysuria, flank pain, frequency and urgency. Musculoskeletal: Negative for arthralgias, back pain, myalgias, neck pain and neck stiffness. Skin: Negative for color change and rash. Neurological: Positive for weakness. Negative for dizziness, tremors, seizures, syncope, speech difficulty, light-headedness, numbness and headaches. Except as noted above the remainder of the review of systems was reviewed and negative.        PAST MEDICAL HISTORY     Past Medical History:   Diagnosis Date    Abnormality of gait and mobility     Adenocarcinoma of transverse colon ([facility] [address] 01/30/2018    Atrial fibrillation (HCC)     CAD (coronary artery disease)     Chronic renal disease, stage 3, moderately decreased glomerular filtration rate between 30-59 mL/min/1.73 square meter 1/20/2016    Depression     Eczema     Hematuria     Hyperlipidemia     Hypertension     Obesity 01/15/2018    BMI 40    Osteoarthritis     Severe nonproliferative diabetic retinopathy of both eyes ([address] 10/25/2017    Type II or unspecified type diabetes mellitus without mention of complication, not stated as uncontrolled     Varicose veins     Vascular dementia with depressed mood ([facility] [address] 3/10/2020    Villous adenoma of colon      Past Surgical History: Procedure Laterality Date    COLONOSCOPY  2009    Polyps    EYE SURGERY Left 2018    EYE SURGERY Left 2019    HYSTERECTOMY      INCISION AND DRAINAGE Right 2017    RIGHT ARM INCISION AND DRAINAGE performed by Cal Zheng MD at [address] [facility] FLX DX W/COLLJ [place] 1978 PFRMD N/A 1/10/2018    COLONOSCOPY performed by Nicky Morocho MD at [address] [facility] B W ANASTOMOSIS N/A 2018    RIGHT COLECTOMY for in situ cancer in polyp     Social History     Socioeconomic History    Marital status:      Spouse name: None    Number of children: 2    Years of education: 15    Highest education level: High school graduate   Occupational History     Employer: RETIRED   Social Needs    Financial resource strain: Not hard at all   [employer] insecurity     Worry: Never true     Inability: Never true   [address] needs     Medical: No     Non-medical: No   Tobacco Use    Smoking status: Former Smoker     Types: Cigarettes     Last attempt to quit: 1992     Years since quittin.4    Smokeless tobacco: Never Used    Tobacco comment: Quit 30 + years   Substance and Sexual Activity    Alcohol use: No     Comment: denies    Drug use: No    Sexual activity: Not Currently   Lifestyle    Physical activity     Days per week: 0 days     Minutes per session: 0 min    Stress: Not at all   Relationships    Social connections     Talks on phone: More than three times a week     Gets together: Twice a week     Attends L[employer]-day service: More than 4 times per year     Active member of club or organization: Yes     Attends meetings of clubs or organizations: More than 4 times per year     Relationship status:      Intimate partner violence     Fear of current or ex partner: None     Emotionally abused: None     Physically abused: None     Forced sexual activity: None   Other Topics Concern    None   Social History Narrative    The patient and Rhythm: Normal rate and regular rhythm. Pulses: Normal pulses. Pulmonary:      Effort: Pulmonary effort is normal.      Breath sounds: Normal breath sounds. Abdominal:      General: Bowel sounds are normal. There is no distension. Palpations: Abdomen is soft. Tenderness: There is no abdominal tenderness. Musculoskeletal: Normal range of motion. General: No tenderness or signs of injury. Skin:     General: Skin is warm and dry. Capillary Refill: Capillary refill takes less than 2 seconds. Neurological:      General: No focal deficit present. Mental Status: She is alert and oriented to person, place, and time. Mental status is at baseline. Cranial Nerves: No cranial nerve deficit. Sensory: No sensory deficit. Motor: No weakness.       Coordination: Coordination normal.         RESULTS     EKG: All EKG's are interpreted by the Emergency Department Physician who either signs or Co-signsthis chart in the absence of a cardiologist.        RADIOLOGY:   Nelly Mcintosh such as CT, Ultrasound and MRI are read by the radiologist. Plain radiographic images are visualized and preliminarily interpreted by the emergency physician with the below findings:        Interpretation per the Radiologist below, if available at the time ofthis note:    No orders to display         ED BEDSIDE ULTRASOUND:   Performed by ED Physician - none    LABS:  Labs Reviewed   CBC WITH AUTO DIFFERENTIAL - Abnormal; Notable for the following components:       Result Value    RBC 3.34 (*)     Hemoglobin 9.5 (*)     Hematocrit 29.6 (*)     MCHC 32.2 (*)     RDW 16.4 (*)     Platelets 593 (*)     Lymphocytes Absolute 0.7 (*)     All other components within normal limits   COMPREHENSIVE METABOLIC PANEL - Abnormal; Notable for the following components:    Glucose 129 (*)     BUN 52 (*)     CREATININE 1.78 (*)     GFR Non- 27.0 (*)     GFR  32.7 (*)     All other components within normal limits   URINE RT REFLEX TO CULTURE - Abnormal; Notable for the following components:    Leukocyte Esterase, Urine MODERATE (*)     All other components within normal limits   MICROSCOPIC URINALYSIS - Abnormal; Notable for the following components:    Bacteria, UA MODERATE (*)     WBC, UA 10-20 (*)     All other components within normal limits   POCT GLUCOSE - Abnormal; Notable for the following components:    POC Glucose 137 (*)     All other components within normal limits   POCT GLUCOSE - Abnormal; Notable for the following components:    POC Glucose 159 (*)     All other components within normal limits   POCT GLUCOSE - Abnormal; Notable for the following components:    POC Glucose 168 (*)     All other components within normal limits   POCT GLUCOSE - Normal   POCT GLUCOSE - Normal   CULTURE, URINE       All other labs were within normal range or not returned as of this dictation. EMERGENCY DEPARTMENT COURSE and DIFFERENTIAL DIAGNOSIS/MDM:   Vitals:    Vitals:    10/09/20 1749 10/09/20 1754 10/09/20 1908 10/09/20 2037   BP:  134/80 128/74 137/60   Pulse: 73  63 68   Resp: 19  20 18   Temp: 97.1 °F (36.2 °C)      TempSrc: Tympanic      SpO2: 95%  99% 100%   Weight: 216 lb (98 kg)      Height: 5' 1\" (1.549 m)               MDM patient presents afebrile nontoxic no acute distress hemodynamically stable alert and oriented x4. Patient states that at this time her only symptom is been a little tired which she states is normal for her when her blood sugar gets low. She states this is happened to her multiple times in the past admits that she did not have lunch today but did take all of her medications. She is shown improvement in her presentation there is no focal neurological deficits. Labs ordered for further evaluation. There is no leukocytosis patient does have a known history of renal disease and her metabolic panel is similar comparison to previous studies.   She does appear to have a urinary tract infection which she states does happened to her multiple times throughout the year. Given first dose of IV Rocephin in the ER. I have spoken the patient about outpatient treatment versus hospitalization. She states she does not want to be placed into the hospital and states that she feels much better and that all of her symptoms are similar to of the episode she had of low blood sugar. She is ambulatory with standby assist with a nurse to the bathroom without difficulty or incident. She states that she would prefer to be discharged home and does not believe she needs to stay in the hospital at this time. She has shown significant provement does appear stable for discharge home this time will be discharged additional antibiotic for the urinary tract infection. She states she does have a monitor at home and checks her blood glucose 3 times a day. She states she feels safer being discharged home and will follow-up with primary care provider soon as possible. Directed to return to the ER for any onset of new concerning symptoms worsening condition specifically onset of any notable weakness dizziness or even mild disorientation or onset of any illness symptoms. Patient verbalized understanding of all given instruction education. CRITICAL CARE TIME       CONSULTS:  None    PROCEDURES:  Unless otherwise noted below, none     Procedures    FINAL IMPRESSION      1. Hypoglycemia    2.  Acute cystitis without hematuria          DISPOSITION/PLAN   DISPOSITION        PATIENT REFERRED TO:  Carolina Arreola MD  [id number] [place] [phone]    Call in 1 day        DISCHARGE MEDICATIONS:  New Prescriptions    CEPHALEXIN (KEFLEX) 250 MG CAPSULE    Take 1 capsule by mouth 3 times daily for 7 days          (Please notethat portions of this note were completed with a voice recognition program.  Efforts were made to edit the dictations but occasionally words are mis-transcribed.)    CHARLIE Arriaza CNP (electronically signed)  Attending Emergency Physician         CHARLIE Arriaza CNP  10/09/20 9160 No

## 2021-05-07 NOTE — ED PROVIDER NOTE - MUSCULOSKELETAL, MLM
Spine appears normal, range of motion is not limited, 1+ edema to the L ankle, no calf tenderness, no muscle or joint tenderness, distal pulses intact.

## 2021-05-21 PROBLEM — I82.409 ACUTE EMBOLISM AND THROMBOSIS OF UNSPECIFIED DEEP VEINS OF UNSPECIFIED LOWER EXTREMITY: Chronic | Status: ACTIVE | Noted: 2021-05-07

## 2021-05-21 PROBLEM — E78.5 HYPERLIPIDEMIA, UNSPECIFIED: Chronic | Status: ACTIVE | Noted: 2021-05-09

## 2021-05-21 PROBLEM — I10 ESSENTIAL (PRIMARY) HYPERTENSION: Chronic | Status: ACTIVE | Noted: 2021-05-09

## 2021-05-26 ENCOUNTER — APPOINTMENT (OUTPATIENT)
Dept: HEMATOLOGY ONCOLOGY | Facility: CLINIC | Age: 71
End: 2021-05-26
Payer: COMMERCIAL

## 2021-05-26 VITALS
DIASTOLIC BLOOD PRESSURE: 80 MMHG | SYSTOLIC BLOOD PRESSURE: 122 MMHG | HEART RATE: 72 BPM | TEMPERATURE: 98.2 F | OXYGEN SATURATION: 99 % | WEIGHT: 220 LBS | BODY MASS INDEX: 25.98 KG/M2 | HEIGHT: 77 IN

## 2021-05-26 LAB
APTT BLD: 38.2 SEC
DEPRECATED D DIMER PPP IA-ACNC: <150 NG/ML DDU
INR PPP: 1.04
PT BLD: 12.4 SEC

## 2021-05-26 PROCEDURE — 99214 OFFICE O/P EST MOD 30 MIN: CPT | Mod: 25

## 2021-05-26 NOTE — REVIEW OF SYSTEMS
[Negative] : Allergic/Immunologic [Joint Pain] : no joint pain [Joint Stiffness] : no joint stiffness [Muscle Pain] : no muscle pain [Muscle Weakness] : no muscle weakness [Easy Bleeding] : no tendency for easy bleeding [Easy Bruising] : no tendency for easy bruising [FreeTextEntry9] : swelling left foot

## 2021-05-26 NOTE — ADDENDUM
[FreeTextEntry1] : I, Uday Casper, acted solely as a scribe for Dr. Galilea Lantigua on 05/26/2021.

## 2021-05-26 NOTE — REASON FOR VISIT
[Follow-Up Visit] : a follow-up visit for [FreeTextEntry2] : recurrent lower extremity deep vein thrombosis, hypercoagulable state, Factor V Leiden mutation

## 2021-05-26 NOTE — END OF VISIT
[FreeTextEntry3] : All medical record entries made by the Scribe were at my, Dr. Galilea Lantigua, direction and personally dictated by me on 05/26/2021. I have reviewed the chart and agree that the record accurately reflects my personal performance of the history, physical exam, assessment and plan. I have also personally directed, reviewed, and agreed with the chart.

## 2021-05-26 NOTE — HISTORY OF PRESENT ILLNESS
[de-identified] : Patient is a 70 year old male with a history of aortic aneurysm, GERD, hyperlipidemia, prostate cancer in 2005, s/p surgical prostatectomy, Factor V Leiden mutation, and DVT of the left lower extremity, elevated homocysteine level, B12 deficiency who now presents for hematologic follow-up after recent recurrent DVT in the left lower extremity.. He was last seen in office in June 2020. Patient's most recent blood results from early May 2021 revealed a hemoglobin of 13, hematocrit 40.5, red blood cell count of 42.9, and platelet count of 157,000. Prothrombin time was 14, INR was 1.18. Activated partial thromboplastin time was 35.2. Patient takes sublingual B12 for low B12, and wears compression socks on his lower extremities. Since the last visit, patient developed swelling in his left foot, for which he saw his PCP, who recommended a Doppler of the LLE be performed. The Doppler revealed a DVT from midthigh to knee, which was subsequently confirmed in the superficial and deep femoral vein in the [facility] ER. This is his third DVT in the LLE, which occurred on Xarelto, with the previous episode occurring 8 years ago. Patient was discharged from the ER on Lovenox, which he had previously been on for 7 years and tolerated well, but had discontinued in January 2019 to start Xarelto instead. Patient has been resistant to Coumadin in the past. He also does not tolerate Eliquis  (previously caused a pruritic rash). Patient states he is feeling generally well at this time, with no acute complaints except for mild residual swelling in the left foot. Denies pain in LLE, excessive bruising, any bleeding, recent infection, fever, chills, or sweats.

## 2021-05-26 NOTE — PHYSICAL EXAM
[Normal] : affect appropriate [de-identified] : varicosities in the lower extremities, left greater than right, mild swelling of the left foot, negative Alex's sign [de-identified] : no ecchymoses or petechiae

## 2021-05-27 LAB
ALBUMIN SERPL ELPH-MCNC: 4.7 G/DL
ALP BLD-CCNC: 84 U/L
ALT SERPL-CCNC: 24 U/L
ANION GAP SERPL CALC-SCNC: 12 MMOL/L
AST SERPL-CCNC: 32 U/L
BASOPHILS # BLD AUTO: 0.11 K/UL
BASOPHILS # BLD AUTO: 0.11 K/UL
BASOPHILS NFR BLD AUTO: 1.8 %
BASOPHILS NFR BLD AUTO: 1.8 %
BILIRUB SERPL-MCNC: 0.4 MG/DL
BUN SERPL-MCNC: 27 MG/DL
CALCIUM SERPL-MCNC: 9.5 MG/DL
CHLORIDE SERPL-SCNC: 107 MMOL/L
CO2 SERPL-SCNC: 23 MMOL/L
CREAT SERPL-MCNC: 1.61 MG/DL
EOSINOPHIL # BLD AUTO: 0 K/UL
EOSINOPHIL # BLD AUTO: 0 K/UL
EOSINOPHIL NFR BLD AUTO: 0 %
EOSINOPHIL NFR BLD AUTO: 0 %
GLUCOSE SERPL-MCNC: 79 MG/DL
HCT VFR BLD CALC: 44.2 %
HGB BLD-MCNC: 14.1 G/DL
LYMPHOCYTES # BLD AUTO: 0.71 K/UL
LYMPHOCYTES # BLD AUTO: 0.71 K/UL
LYMPHOCYTES NFR BLD AUTO: 11.8 %
LYMPHOCYTES NFR BLD AUTO: 11.8 %
MAN DIFF?: NORMAL
MCHC RBC-ENTMCNC: 30.4 PG
MCHC RBC-ENTMCNC: 31.9 GM/DL
MCV RBC AUTO: 95.3 FL
MONOCYTES # BLD AUTO: 0.17 K/UL
MONOCYTES # BLD AUTO: 0.17 K/UL
MONOCYTES NFR BLD AUTO: 2.8 %
MONOCYTES NFR BLD AUTO: 2.8 %
MSMEAR: NORMAL
NEUTROPHILS # BLD AUTO: 5 K/UL
NEUTROPHILS # BLD AUTO: 5 K/UL
NEUTROPHILS NFR BLD AUTO: 82.7 %
NEUTROPHILS NFR BLD AUTO: 82.7 %
NEUTS BAND NFR BLD MANUAL: 0.9 %
PLAT MORPH BLD: ABNORMAL
PLATELET # BLD AUTO: 190 K/UL
POIKILOCYTOSIS BLD QL SMEAR: SLIGHT
POTASSIUM SERPL-SCNC: 4.8 MMOL/L
PROT SERPL-MCNC: 7.1 G/DL
RBC # BLD: 4.64 M/UL
RBC # FLD: 14.2 %
RBC AGGLUT: PRESENT
RBC BLD AUTO: NORMAL
SMUDGE CELLS # BLD: PRESENT
SODIUM SERPL-SCNC: 142 MMOL/L
WBC # FLD AUTO: 5.98 K/UL

## 2021-05-28 LAB
FOLATE SERPL-MCNC: >20 NG/ML
VIT B12 SERPL-MCNC: 502 PG/ML

## 2021-06-22 ENCOUNTER — APPOINTMENT (OUTPATIENT)
Dept: VASCULAR SURGERY | Facility: CLINIC | Age: 71
End: 2021-06-22
Payer: COMMERCIAL

## 2021-06-22 PROCEDURE — 99203 OFFICE O/P NEW LOW 30 MIN: CPT

## 2021-06-22 PROCEDURE — 93971 EXTREMITY STUDY: CPT

## 2021-06-24 NOTE — HISTORY OF PRESENT ILLNESS
[FreeTextEntry1] : 70yoM w/previous h/o DVT in LLE 9y prior after frequent flights, referred by Dr. Lantigua for evaluation of the LLE for suspected recurrent DVT.  Pt states that he developed pain and tightness behind the L knee 9y prior after a long flight, was diagnosed w/fem-pop v DVT and was immediately started on Coumadin, but was later noted to be coumadin unresponsive.  Mr. Anne was later changed to Lovenox which he remained on for ~2y.  More recently, pt reported another episode of posterior knee pain and was restarted on Lovenox as duplex demonstrated recurrent fem-pop thrombus.\par \par Pt denies SOB/chest pain, and reports no issues w/bleeding/bruising w/current Lovenox use.

## 2021-06-24 NOTE — ASSESSMENT
[FreeTextEntry1] : 70yoM w/previous h/o DVT in LLE 9y prior after frequent flights, referred by Dr. Lantigua for evaluation of the LLE for suspected recurrent DVT.  Pt states that he developed pain and tightness behind the L knee 9y prior after a long flight, was diagnosed w/fem-pop v DVT and was immediately started on Coumadin, but was later noted to be coumadin unresponsive.  Mr. Anne was later changed to Lovenox which he remained on for ~2y.  More recently, pt reported another episode of posterior knee pain and was restarted on Lovenox as duplex demonstrated recurrent fem-pop thrombus.\par \par Slight discrepancy in limb girth noted in LEs (L>R), but L calf soft, non-tender and not tense w/o any palpable cords noted.  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  Reassured pt that there is no acute thrombus present that would necessitate full anticoagulation at this time, but would recommend daily compression stocking use for treatment of his LLE edema, and encourage elevation/exercise/hydration to prevent recurrent DVT.\par \par I, Dr. Annika Yo, personally performed the evaluation and management (E/M) services for this new patient.  That E/M includes conducting the initial examination, assessing all conditions, and establishing the plan of care.  Today, ACP, Venu Cardoso, was here to observe my evaluation and management services for this patient to be followed going forward.

## 2021-06-24 NOTE — ADDENDUM
[FreeTextEntry1] : This note was written by Venu Reddy, acting as a scribe for Dr. Annika Yo.  I, Dr. Annika Yo, have read and attest that all the information, medical decision-making, and discharge instructions within are true and accurate.\par \par I, Dr. Annika Yo, personally performed the evaluation and management (E/M) services for this new patient.  That E/M includes conducting the initial examination, assessing all conditions, and establishing the plan of care.  Today, my ACP, Venu Reddy, was here to observe my evaluation and management services for this patient to be followed going forward.

## 2021-06-24 NOTE — PROCEDURE
[FreeTextEntry1] : Duplex performed today to assess for acute LLE thrombus demonstrates chronic CFV-fem v-pop v DVT w/CFV scarring and partial recanalization of the pop v, no evidence of acute LLE DVT/SVT

## 2021-06-24 NOTE — PHYSICAL EXAM
[Normal Thyroid] : the thyroid was normal [Normal Breath Sounds] : Normal breath sounds [Normal Heart Sounds] : normal heart sounds [Normal Rate and Rhythm] : normal rate and rhythm [2+] : left 2+ [Ankle Swelling (On Exam)] : present [Ankle Swelling On The Right] : mild [Ankle Swelling On The Left] : of the left ankle [No HSM] : no hepatosplenomegaly [No Rash or Lesion] : No rash or lesion [Alert] : alert [Calm] : calm [JVD] : no jugular venous distention  [Carotid Bruits] : no carotid bruits [Right Carotid Bruit] : no bruit heard over the right carotid [Left Carotid Bruit] : no bruit heard over the left carotid [Varicose Veins Of Lower Extremities] : not present [] : not present [Abdomen Masses] : No abdominal masses [Abdomen Tenderness] : ~T ~M No abdominal tenderness [Purpura] : no purpura  [Petechiae] : no petechiae [Skin Ulcer] : no ulcer [Skin Induration] : no induration [de-identified] : Well-nourished, NAD [de-identified] : NC/AT, anicteric [de-identified] : FROM throughout, strength 5/5x4, no palpable cords in LLE [de-identified] : Neurosensory grossly intact in LEs

## 2021-10-22 ENCOUNTER — APPOINTMENT (OUTPATIENT)
Dept: OTOLARYNGOLOGY | Facility: CLINIC | Age: 71
End: 2021-10-22
Payer: MEDICARE

## 2021-10-22 VITALS — WEIGHT: 220 LBS | TEMPERATURE: 96.7 F | HEIGHT: 77 IN | BODY MASS INDEX: 25.98 KG/M2

## 2021-10-22 DIAGNOSIS — H93.19 TINNITUS, UNSPECIFIED EAR: ICD-10-CM

## 2021-10-22 DIAGNOSIS — H61.22 IMPACTED CERUMEN, LEFT EAR: ICD-10-CM

## 2021-10-22 PROCEDURE — 69210 REMOVE IMPACTED EAR WAX UNI: CPT

## 2021-10-22 PROCEDURE — 99213 OFFICE O/P EST LOW 20 MIN: CPT | Mod: 25

## 2021-10-22 RX ORDER — OMEPRAZOLE 40 MG/1
40 CAPSULE, DELAYED RELEASE ORAL
Qty: 90 | Refills: 3 | Status: COMPLETED | COMMUNITY
Start: 2019-04-17 | End: 2021-10-22

## 2021-10-22 RX ORDER — RIVAROXABAN 20 MG/1
20 TABLET, FILM COATED ORAL
Qty: 30 | Refills: 0 | Status: COMPLETED | COMMUNITY
Start: 2019-03-22 | End: 2021-10-22

## 2021-10-22 RX ORDER — RIVAROXABAN 20 MG/1
20 TABLET, FILM COATED ORAL
Qty: 90 | Refills: 3 | Status: COMPLETED | COMMUNITY
Start: 2020-01-09 | End: 2021-10-22

## 2021-10-22 RX ORDER — RIVAROXABAN 20 MG/1
20 TABLET, FILM COATED ORAL
Qty: 90 | Refills: 0 | Status: COMPLETED | COMMUNITY
Start: 2019-06-20 | End: 2021-10-22

## 2021-10-22 NOTE — ASSESSMENT
[FreeTextEntry1] : It was my impression that the patient had a cerumen impaction that was cleared.  I recommended topical moisturizing.  I recommended avoiding Q-tips.  I reviewed aural hygiene and the role of cerumen with the patient.  I suggested a repeat visit in a year or earlier should the need arise.\par He has a history of tinnitus but is not bothering him now.\par He feels his hearing was good and was not interested in having it rechecked today.  His last audiogram about a decade ago showed a mild snhl with slight asymmetry.\par Mucosal obstruction in the septal deflection and for which she did not want intervention. He uses alcohol he finds helps.

## 2021-10-22 NOTE — HISTORY OF PRESENT ILLNESS
[de-identified] : YUE HENSON is a 71 year old male who comes in complaining of A cerumen impaction in his left ear. I had seen him in the past with cerumen impactions tinnitus and also barotrauma. He also complains of nasal congestion, helped by alkalol.  He is a musician.The patient had no other ear nose or throat complaints at this visit.

## 2021-10-22 NOTE — CONSULT LETTER
[FreeTextEntry2] : LATIA CHUNG\par  [FreeTextEntry1] : \par \par Dear  Dr. LATIA CHUNG,\par \par I had the pleasure of seeing your patient today.  \par Please see my note below.\par \par \par Thank you very much for allowing me to participate in the care of your patient.\par \par Sincerely,\par \par \par Anderson Mercedes MD\par [facility]\par [facility]\par  [facility]\par \par

## 2021-10-22 NOTE — PHYSICAL EXAM
[FreeTextEntry1] : General:\par The patient was alert and oriented and in no distress.\par Voice was clear.\par \par Face:\par The patient had no facial asymmetry or mass.\par The skin was unremarkable.\par \par Ears:\par Procedure note: Debridement of cerumen impaction left ear   43661\par \par Dx:  symptomatic cerumen impaction left ear \par Both external ears were normal.\par There was a dense cerumen impaction in the left ear.  This was cleared microscopically without trauma, using suction and curettes.  Beyond that, both ear canals were clear and both eardrums were intact and mobile.\par \par Nose: \par The external nose had no significant deformity.  There was no facial tenderness.  On anterior rhinoscopy, the nasal mucosa was clear.  The anterior septum was midline.  There were no visualized polyps purulence  or masses. There is a significant right septal deflection more posteriorly\par \par Oral cavity:\par The oral mucosa was normal.\par The oral and base of tongue were clear and without mass.\par The gingival and buccal mucosa were moist and without lesions.\par The palate moved well.\par There was no cleft to the palate.\par There appeared to be good salivary flow.  \par There was no pus, erythema or mass in the oral cavity.\par \par Neck: \par The neck was symmetrical.\par The parotid and submandibular glands were normal without masses.\par The trachea was midline and there was no unusual crepitus.\par The thyroid was smooth and nontender and no masses were palpated.\par There was no significant cervical adenopathy.\par \par Face:\par The patient had no facial asymmetry or mass.\par The skin was unremarkable.\par \par Neuro:\par Neurologically, the patient was awake, alert, and oriented to person, place and time. There were no obvious focal neurologic abnormalities.  Cranial nerves II through XII were grossly intact.

## 2022-02-08 NOTE — ED ADULT NURSE NOTE - NS ED NURSE LEVEL OF CONSCIOUSNESS ORIENTATION
February 8, 2022      Gabe Nazario       [id number] [facility] [id number]      Dear Gabe,    There’s no question about it – preventive care can save lives or can help stop a disease process in its tracks. Many health problems start out silently without symptoms. Preventive care is often the only way to catch these problems in early stages, when they can be more successfully treated.    Our records show that you are due for the following tests or immunizations. If you have had these tests or immunizations done, please call us so we can update your record.    · Colonoscopy: Colorectal cancer usually comes from polyps (abnormal growths) in the colon or rectum. Colonoscopy can find the polyps and remove them before they turn to cancer. To schedule your Colonoscopy, please call: [phone]       Your good health is important to us. Please feel free to call my office at [phone] with any questions.          Sincerely,         Miranda Schumacher MD  [id number]  [facility] [address]  [phone]     Enclosures:        [employer] offers online access to your health information via [email or web address] .   By registering for [employer] you will be able to view test results, pay your bill, send messages to your care team (not for immediate response), refill prescriptions, schedule and verify appointments and much more. You may also download the [employer] jordi from the [employer] Store or Google Play to access your   health information.    
Oriented - self; Oriented - place; Oriented - time

## 2022-02-25 ENCOUNTER — NON-APPOINTMENT (OUTPATIENT)
Age: 72
End: 2022-02-25

## 2022-03-08 ENCOUNTER — NON-APPOINTMENT (OUTPATIENT)
Age: 72
End: 2022-03-08

## 2022-03-22 ENCOUNTER — APPOINTMENT (OUTPATIENT)
Dept: VASCULAR SURGERY | Facility: CLINIC | Age: 72
End: 2022-03-22
Payer: MEDICARE

## 2022-03-22 PROCEDURE — 99213 OFFICE O/P EST LOW 20 MIN: CPT

## 2022-03-22 PROCEDURE — 93971 EXTREMITY STUDY: CPT

## 2022-03-24 NOTE — HISTORY OF PRESENT ILLNESS
[FreeTextEntry1] : 71yoM w/previous h/o DVT in LLE 10y prior after frequent flights, followed by Dr. Lantigua and referred back for evaluation of his LLE in the setting of a recent fall w/trauma to the L hip and knee, now w/dull achy pain throughout the leg.  Pt is back on Lovenox 150mg SQ qd w/o issue.\par \par Pt denies SOB/chest pain, and reports no issues w/bleeding/bruising w/current Lovenox use.

## 2022-03-24 NOTE — ADDENDUM
[FreeTextEntry1] : This note was written by Venu Reddy, acting as a scribe for Dr. Annika Yo.  I, Dr. Annika Yo, have read and attest that all the information, medical decision-making, and discharge instructions within are true and accurate.\par \par I, Dr. Annika Yo, personally performed the evaluation and management (E/M) services for this established patient who presents today with (an) existing condition(s).  That E/M includes conducting the examination, assessing all conditions, and (re)establishing/reinforcing a plan of care.  Today, my ACP, Venu Reddy, was here to observe my evaluation and management services for this condition to be followed going forward.

## 2022-03-24 NOTE — PHYSICAL EXAM
[Normal Thyroid] : the thyroid was normal [Normal Breath Sounds] : Normal breath sounds [Normal Heart Sounds] : normal heart sounds [Normal Rate and Rhythm] : normal rate and rhythm [2+] : left 2+ [Ankle Swelling (On Exam)] : present [Ankle Swelling On The Left] : of the left ankle [Ankle Swelling On The Right] : mild [No HSM] : no hepatosplenomegaly [No Rash or Lesion] : No rash or lesion [Alert] : alert [Calm] : calm [JVD] : no jugular venous distention  [Carotid Bruits] : no carotid bruits [Right Carotid Bruit] : no bruit heard over the right carotid [Left Carotid Bruit] : no bruit heard over the left carotid [Varicose Veins Of Lower Extremities] : not present [] : not present [Abdomen Masses] : No abdominal masses [Abdomen Tenderness] : ~T ~M No abdominal tenderness [Purpura] : no purpura  [Petechiae] : no petechiae [Skin Ulcer] : no ulcer [de-identified] : Well-nourished, NAD [Skin Induration] : no induration [de-identified] : NC/AT, anicteric [de-identified] : FROM throughout, strength 5/5x4, no palpable cords in LLE [de-identified] : Neurosensory grossly intact in LEs

## 2022-03-24 NOTE — ASSESSMENT
[FreeTextEntry1] : 71yoM w/previous h/o DVT in LLE 10y prior after frequent flights, followed by Dr. Lantigua and referred back for evaluation of his LLE in the setting of a recent fall w/trauma to the L hip and knee, now w/dull achy pain throughout the leg.  Pt is back on Lovenox 150mg SQ qd w/o issue.\par \par Slight chronic discrepancy in limb girth noted in LEs (L>R), but L calf soft, non-tender and not tense w/o any palpable cords noted.  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  Reassured pt that there is no acute thrombus, hematoma, or pseudoaneurysm  present at this time.  Recommend daily compression, elevation, and exercise to control his edema.\par \par I, Dr. Yo, personally performed the evaluation and management (E/M) services for this established patient who presents today with (a) new problem(s)/exacerbation of (an) existing condition(s).  That E/M includes conducting the examination, assessing all new/exacerbated conditions, and establishing a new plan of care.  Today, ACP, Venu Cardoso, was here to observe my evaluation and management services for this new problem/exacerbated condition to be followed going forward.

## 2022-05-19 ENCOUNTER — APPOINTMENT (OUTPATIENT)
Dept: HEMATOLOGY ONCOLOGY | Facility: CLINIC | Age: 72
End: 2022-05-19
Payer: MEDICARE

## 2022-05-19 VITALS
DIASTOLIC BLOOD PRESSURE: 68 MMHG | OXYGEN SATURATION: 97 % | HEIGHT: 77 IN | TEMPERATURE: 95.5 F | BODY MASS INDEX: 25.74 KG/M2 | SYSTOLIC BLOOD PRESSURE: 118 MMHG | WEIGHT: 218 LBS | HEART RATE: 66 BPM

## 2022-05-19 LAB
APTT BLD: 39 SEC
INR PPP: 1.05
PT BLD: 12.5 SEC

## 2022-05-19 PROCEDURE — 36415 COLL VENOUS BLD VENIPUNCTURE: CPT

## 2022-05-19 PROCEDURE — 99214 OFFICE O/P EST MOD 30 MIN: CPT | Mod: 25

## 2022-05-19 NOTE — HISTORY OF PRESENT ILLNESS
[de-identified] : \par Patient is a 71 year old male with a history of aortic aneurysm, GERD, hyperlipidemia, prostate cancer in 2005, s/p surgical prostatectomy, Factor V Leiden mutation, and DVT of the left lower extremity, elevated homocysteine level, B12 deficiency who now presents for hematologic follow-up.. He was last seen in office in May 2021 after he developed left lower extremity pain and the Doppler revealed a DVT from midthigh to knee, which was subsequently confirmed in the superficial and deep femoral vein in the [facility] ER. This was his third DVT in the LLE, which occurred while on Xarelto, with the previous episode occurring 8 years prior. Patient was discharged from the ER on Lovenox, which he had previously been on for 7 years and tolerated well, but had discontinued in January 2019 to start Xarelto instead. Patient has been resistant to Coumadin in the past. He also does not tolerate Eliquis (previously caused a pruritic rash).  Patient had fallen on the ice this past winter with injury to his left leg.  Repeat evaluation did not reveal any thrombus or hematoma.  He follows with , vascular surgeon.  Patient states he is feeling generally well at this time, with no acute complaints.  He recently saw his PCP and underwent an ultrasound of the kidneys and bladder for follow-up of his renal insufficiency.  Patient states that all was stable.  The patient has not yet had a bone density as recommended after his last visit a year ago.  Denies pain in LLE, excessive bruising, any bleeding, recent infection, fever, chills, or sweats. \par

## 2022-05-19 NOTE — ASSESSMENT
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 Recommended that patient again obtain a requisition from his PCP for testing his bone density, as his long term use of Lovenox places him at higher risk of developing osteoporosis. He will continue taking sublingual B12. Have ordered CBC, CMP,  PT/INR, and APTT , B12 and folate.  Venipuncture was performed in the office today.  Lovenox dosage adjustment to be considered due to patient's age and pending renal function. Patient advised to call next week to discuss results and next appointment date. \par \par

## 2022-05-19 NOTE — PHYSICAL EXAM
[Normal] : affect appropriate [de-identified] : varicosities in the lower extremities [de-identified] : no ecchymoses or petechiae

## 2022-05-19 NOTE — REASON FOR VISIT
[Follow-Up Visit] : a follow-up visit for [FreeTextEntry2] : Recurrent lower extremity DVT, factor V Leiden mutation, hypercoagulable state, lupus anticoagulant positive, Low vitamin B12 level

## 2022-05-20 LAB
ALBUMIN SERPL ELPH-MCNC: 4.6 G/DL
ALP BLD-CCNC: 63 U/L
ALT SERPL-CCNC: 18 U/L
ANION GAP SERPL CALC-SCNC: 15 MMOL/L
AST SERPL-CCNC: 23 U/L
BASOPHILS # BLD AUTO: 0.04 K/UL
BASOPHILS # BLD AUTO: 0.04 K/UL
BASOPHILS NFR BLD AUTO: 0.9 %
BASOPHILS NFR BLD AUTO: 0.9 %
BILIRUB SERPL-MCNC: 0.5 MG/DL
BUN SERPL-MCNC: 26 MG/DL
BURR CELLS BLD QL SMEAR: PRESENT
CALCIUM SERPL-MCNC: 9.3 MG/DL
CHLORIDE SERPL-SCNC: 107 MMOL/L
CO2 SERPL-SCNC: 20 MMOL/L
CREAT SERPL-MCNC: 1.44 MG/DL
DACRYOCYTES BLD QL SMEAR: SLIGHT
EGFR: 51 ML/MIN/1.73M2
EOSINOPHIL # BLD AUTO: 0.12 K/UL
EOSINOPHIL # BLD AUTO: 0.12 K/UL
EOSINOPHIL NFR BLD AUTO: 2.6 %
EOSINOPHIL NFR BLD AUTO: 2.6 %
FOLATE SERPL-MCNC: 19.6 NG/ML
GIANT PLATELETS BLD QL SMEAR: PRESENT
GLUCOSE SERPL-MCNC: 144 MG/DL
HCT VFR BLD CALC: 43.3 %
HGB BLD-MCNC: 13.7 G/DL
LYMPHOCYTES # BLD AUTO: 0.97 K/UL
LYMPHOCYTES # BLD AUTO: 0.97 K/UL
LYMPHOCYTES NFR BLD AUTO: 21 %
LYMPHOCYTES NFR BLD AUTO: 21 %
MAN DIFF?: NORMAL
MCHC RBC-ENTMCNC: 30.3 PG
MCHC RBC-ENTMCNC: 31.6 GM/DL
MCV RBC AUTO: 95.8 FL
MONOCYTES # BLD AUTO: 0.2 K/UL
MONOCYTES # BLD AUTO: 0.2 K/UL
MONOCYTES NFR BLD AUTO: 4.4 %
MONOCYTES NFR BLD AUTO: 4.4 %
MSMEAR: NORMAL
MYELOCYTES NFR BLD: 1.8 %
NEUTROPHILS # BLD AUTO: 3.19 K/UL
NEUTROPHILS # BLD AUTO: 3.19 K/UL
NEUTROPHILS NFR BLD AUTO: 69.3 %
NEUTROPHILS NFR BLD AUTO: 69.3 %
PLAT MORPH BLD: ABNORMAL
PLATELET # BLD AUTO: 179 K/UL
POIKILOCYTOSIS BLD QL SMEAR: SLIGHT
POLYCHROMASIA BLD QL SMEAR: SLIGHT
POTASSIUM SERPL-SCNC: 4.5 MMOL/L
PROT SERPL-MCNC: 6.9 G/DL
RBC # BLD: 4.52 M/UL
RBC # FLD: 13.8 %
RBC BLD AUTO: ABNORMAL
SODIUM SERPL-SCNC: 142 MMOL/L
VIT B12 SERPL-MCNC: 463 PG/ML
WBC # FLD AUTO: 4.6 K/UL

## 2022-05-23 ENCOUNTER — NON-APPOINTMENT (OUTPATIENT)
Age: 72
End: 2022-05-23

## 2022-07-18 NOTE — ED ADULT TRIAGE NOTE - TEMPERATURE IN CELSIUS (DEGREES C)
Pre-Operative Instructions    Patient name: Carl Dominguez         We have scheduled you today for a GI procedure that will be performed sometime within the next few months. Because we realize that there may be some changes in the maintenance of your health after today, but prior to your procedure, we ask that you note the followin. If you have any additional medications that are added to the current medications you are taking, please notify our office so that we can properly instruct you in how to take this around the time of your procedure. For example,  if you started on any type of blood thinner, any type of anti inflammatory medication, or any type of iron supplement or vitamin, these medications will need to be stopped, depending on what they are, several days BEFORE the procedure. If you are given pain medications or any new type of heart medication or blood pressure pills or are a newly diagnoses diabetic put on blood sugar medication, we may also need to make adjustments regarding these.  2. If you have any type of device implanted (pacemaker, defibrillator, implanted pain device or stimulator, or have any kind of joint replacement) please let us know, as special arrangements may need to be made in order for the procedure to be performed.  3. If you develop new allergies, such as Latex or Versed, special arrangements may need to be made.  4. We will also need to be made aware of any changes in your insurance as we want to be sure that you receive the full benefit of your plan.      We understand that situations may arise causing you to cancel and/or reschedule your procedure date. We would appreciate at least two weeks notice. Thank you for your cooperation.    The staff of the [facility] offers these suggestions if you are scheduled for surgery.    1. Plan for unforeseen changes in surgery time. Although we try to maintain a set surgery schedule, there are times when a  surgery case may take longer than expected. This may result in your being in the surgery center longer than originally planned.      2. Arrange for an adult to stay with you at the surgery center. It is very important that you have an adult stay with you at the surgery center during your procedure. The medications you receive can make you sleepy and forgetful. For this reason, the doctor may want to discuss your surgery and post-operative care with an adult friend or family member. Additionally, an adult will be needed to drive you home. An adult must stay with you for the first 24 hours after your surgery. IF YOU DO  NOT HAVE AN ADULT TO DRIVE YOU HOME, YOUR SURGERY WILL BE CANCELLED.     3. Remember to follow pre-operative dietary restrictions. An empty stomach is imperative to prevent nausea or vomiting during and after your procedure.      4. Things to bring with you:  · a list of your current medications (including \"over the counter\" and herbal medications)  · important legal documents such as Power of , Guardianship papers  · any assistive device you are currently using (crutches, walker, hearing aid, etc.)    5. Limit visitors while you are at the surgery center. The time spent at the surgery center is very brief and will be limited to preparing you for surgery and assisting you to recover afterwards. Surgery center rooms are very small, which requires us to limit the number of visitors allowed in at one time.      6. Avoid alcoholic beverages. Because you will be asked to \"fast\" before your procedure, your body will be in a naturally dehydrated state. Alcohol will add to this dehydration making you more prone to problems with blood pressure during and after your procedure You should avoid alcoholic beverages for at least 24 hours prior to your surgery.    7. Wear loose comfortable clothes. Clothes that are easy to put on and take off are best. Sweat pants, shirts with buttons, and slip-on shoes are  wise choices. Avoid tight-fitting clothing such as blue jeans and turtlenecks.    8. Patients on Anticoagulation Medications:   IF YOU HAVE NOT RECEIVED INSTRUCTIONS REGARDING YOUR BLOOD THINNING MEDICATIONS WITHIN 7 DAYS OF YOUR SURGERY, PLEASE CALL THE GI DOCTOR'S OFFICE. FAILURE TO DO SO MAY RESULT IN CANCELLATION OF YOUR SURGERY.     9. Ask questions and insist on answers. Surgery can be a stressful time for anyone. If you have any questions or are unsure about any information given to you, be sure to ask for clarification. A patient can never ask too many questions. If there is something on your mind, let us know. We always want you to feel safe and confident in the care you are receiving.     TO ALL [facility] AMBULATORY SURGERY PATIENTS    You can decide today about the care you will receive in the future. [facility] respects your rights and will support your decisions to the fullest extent permitted by law, including your right to refuse or accept medical or surgical treatment. Please be advised that the [facility] prohibits ambulatory surgery centers from upholding Advanced Directives during surgical procedures. For this reason, any Advanced Directive will be null and void during your stay in the [facility].     Although not honored in the [facility], you are still entitled to be informed about your rights surrounding Advanced Directives. Advanced Directives are legal documents that enable you to specify what forms of treatment you want performed or withheld should you become unable to make or communicate these decisions on your own.  Although this is not a common decision made by outpatient surgery patients, we would like to let you know that an information booklet, \"A Personal Decision\" is available upon your request.      How do you know if an Advanced Directive is right  for you?  It is important to realize your rights as an individual, what the nature of consent for treatment implies, what a durable power of  for health care is and what a living will is.      After reviewing the booklet, \"A Personal Decision,\" should you have any further questions, please call us at [phone].      Thank you.     PATIENT’S RIGHTS    I. To be treated with respect, consideration and dignity, free from all forms of abuse, harassment and discrimination.     II. To receive quality care and high professional standards in a safe environment.    II. To be provided with appropriate privacy.    III. To expect that all disclosures and records are treated confidentially and, except when required by law, to be given the opportunity to approve or refuse their release.    IV. To be provided, to the degree known, complete information concerning their diagnosis, treatment and prognosis. When appropriate, the information is provided to a person designated by the patient to be a legally authorized person.    V. To review the records pertaining to his/her medical care and to have the information explained or interpreted as necessary except when restricted law.    VI. To expect that we will communicate with you in a matter that you can understand.     VII. To be given the names of all practitioners and health care personnel participating in his/her care.    VIII. To make decisions about the plan of care prior to and during the course of treatment.  IX. To refuse a recommended treatment or plan of care to the extent permitted by law and to be informed of the medical consequences of this action.     X. To expect that your treatment preferences will be responded to as delineated in your Advanced Directive, within the limits of the [facility] bylaws regarding resuscitation    XI. To be informed as to:  A. Rights and Responsibilities.  B. Services available in the organization.  C. Provisions for after-hours and  emergency care.  D.  The charges for services and available payment options.   E. The right to consent or decline participation in proposed research  studies.  F.  The available resources for resolving disputes, grievances, and conflicts.      XII. To expect emergency procedures to be implemented without unnecessary delay.    XIII. To expect a safe hospital transfer with appropriate medical records when necessary.     XIV. To know that all patients rights extend to the patient’s legal representatives.     XV. Complaints regarding Patients Rights or any issue surrounding care received during your stay can be made by contacting any of the following organizations:  i. Medicare patient: Visit the Office of Medicare Beneficiary Ombudsman at www.medicare.gov on the web.  Or call 1-800-Medicare (1-675.713.5578).     TTY users should call 1-153.783.5948.  ii. Non-Medicare patients can contact the [facility] at [phone]; fax [phone], TTY [phone].  iii. Any patient can also contact the [facility] at [phone] (toll free 8:30 am to 5:00 pm, central time, weekdays).        Patient Responsibilities    It is your responsibility as a [facility] patient:    · To provide all personal and family health information needed to provide you with appropriate care.    · To participate to the best of your ability in making decisions about your medical treatment, and to comply with the agreed upon plan of care.    · To ask questions of your physician or other care providers when you do not understand any information or instructions.    · To maintain appointments as scheduled, or to reschedule in a timely fashion.    · To inform your physician and other care providers if you anticipate problems in following prescribed treatment.    · To recognize the impact of your lifestyle on your personal health.    · To inform your physician or other care provider if you desire a  transfer of care to another physician.    · To be considerate of others receiving and providing care.  To observe relevant surgery center policies and procedures.    · To accept financial responsibility for health care services and to work cooperatively with [facility] to resolve financial obligations    Please contact [facility] regarding billing, to verify your coverage and any out-of-pocket responsibility at [phone]             36.7

## 2022-07-27 ENCOUNTER — APPOINTMENT (OUTPATIENT)
Dept: HEART AND VASCULAR | Facility: CLINIC | Age: 72
End: 2022-07-27

## 2022-07-27 ENCOUNTER — NON-APPOINTMENT (OUTPATIENT)
Age: 72
End: 2022-07-27

## 2022-07-27 VITALS
OXYGEN SATURATION: 98 % | WEIGHT: 215 LBS | BODY MASS INDEX: 25.39 KG/M2 | HEIGHT: 77 IN | SYSTOLIC BLOOD PRESSURE: 98 MMHG | DIASTOLIC BLOOD PRESSURE: 70 MMHG | HEART RATE: 63 BPM | TEMPERATURE: 97.6 F

## 2022-07-27 VITALS — SYSTOLIC BLOOD PRESSURE: 109 MMHG | HEART RATE: 63 BPM | DIASTOLIC BLOOD PRESSURE: 71 MMHG

## 2022-07-27 DIAGNOSIS — R06.00 DYSPNEA, UNSPECIFIED: ICD-10-CM

## 2022-07-27 PROCEDURE — 99204 OFFICE O/P NEW MOD 45 MIN: CPT | Mod: 25

## 2022-07-27 PROCEDURE — 93000 ELECTROCARDIOGRAM COMPLETE: CPT

## 2022-07-27 NOTE — DISCUSSION/SUMMARY
[FreeTextEntry1] : All relevant risks and benefits discussed. Patient verbalizes understanding and agreement with plan.

## 2022-07-27 NOTE — HISTORY OF PRESENT ILLNESS
[FreeTextEntry1] : Joss Anne is a 71 year old male with a pmhx of prostate cancer in 2005 s/p surgical prostatectomy, GERD, renal insufficiency, aortic aneurysm, HLD, Factor V Leiden mutation, and DVT of the left lower extremity (X3 while on Xarelto). Resistant to Coumadin in the past and does not tolerate Eliquis. He follows with , vascular surgeon. He is here to re-establish care. He was last seen in the office in 2019. 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 He has been well but had an episode of LANDRY lasting 10 minutes during one of his shows. No other associated symptoms and relieved with rest and drinking water. Presently denies CP, LE edema, dizziness, palpitations, syncope or near syncope. He walks numerous miles daily and LANDRY up to 3 flights of stairs. He is here to re-establish care.\par \par of note: He recently changed his PCP, Dr. Bermudez who adarsh labs. Pt reports results were wnl and he will fax results.\par

## 2022-07-27 NOTE — REASON FOR VISIT
[Symptom and Test Evaluation] : symptom and test evaluation [Hyperlipidemia] : hyperlipidemia [FreeTextEntry1] : Diagnostic Test:\par ----------------------------------\par ECG:\par 7/27/2022: Sinus bradycardia, 59 bpm, left axis, LAFB, anterior infarct (age undetermined)\par ----------------------------------\par Echo:\par 0/04/2015: EF 55-60%. Moderate aortic root dilation (4.9 cm) . Ascending aorta is dilated (4.5 cm) and aortic arch is dialted (4.6cm). Nml LVSF. Mild MR\par -----------------------------------\par Stress:\par Date:\par -----------------------------------\par Holter:\par 2/11/2019: Rare PVCs. No arrhythmias\par

## 2022-07-27 NOTE — ASSESSMENT
[FreeTextEntry1] : CAD: new LANDRY\par -Will send for a cardiac CTCA to define the extent of CAD (risk, benefits, alternatives discussed and all procedure related questions answered).\par \par Hyperlipidemia: \par - Continue Crestor 5 mg daily\par - Continue Trilipix 135mg daily\par - Discussed therapeutic lifestyle changes to promote improved lipid metabolism (low\par fat, low cholesterol heart healthy diet, striving for optimal weight control, and aerobic exercises\par as tolerated)\par - Will consider increasing dose depending on CTCA  results\par - Pt will fax in most recent labs\par \par Aortic Aneurysm: 4.9 cm ascending thoracic ( 2016)\par - CTA to evaluate aortic size and for coarctation\par - Obtain echo for size and follow echo q6 months\par - Will refer to Dr. Joyce and enroll to aortic aneurysm clinic pending results (>4.5cm)\par - Discussed avoidance of heavy weightlifting exercise\par - Continue Toprol 25mg daily\par \par H/O multiple DVT: stable\par - Continue on Lovenox 150mg daily\par - Follow up with Dr. Lantigua\par \par GERD: stable\par - Continue omeprazole 40mg daily\par \par EKG obtained to evaluate rhythm abnormalities

## 2022-08-24 PROBLEM — R06.00 DOE (DYSPNEA ON EXERTION): Status: ACTIVE | Noted: 2022-07-27

## 2022-08-25 ENCOUNTER — NON-APPOINTMENT (OUTPATIENT)
Age: 72
End: 2022-08-25

## 2022-08-30 ENCOUNTER — NON-APPOINTMENT (OUTPATIENT)
Age: 72
End: 2022-08-30

## 2022-09-02 ENCOUNTER — APPOINTMENT (OUTPATIENT)
Dept: HEART AND VASCULAR | Facility: CLINIC | Age: 72
End: 2022-09-02

## 2022-09-02 PROCEDURE — 93306 TTE W/DOPPLER COMPLETE: CPT

## 2022-09-07 ENCOUNTER — APPOINTMENT (OUTPATIENT)
Dept: CARDIOTHORACIC SURGERY | Facility: CLINIC | Age: 72
End: 2022-09-07

## 2022-09-07 ENCOUNTER — NON-APPOINTMENT (OUTPATIENT)
Age: 72
End: 2022-09-07

## 2022-09-07 VITALS
HEART RATE: 57 BPM | OXYGEN SATURATION: 97 % | SYSTOLIC BLOOD PRESSURE: 105 MMHG | WEIGHT: 215 LBS | BODY MASS INDEX: 25.39 KG/M2 | TEMPERATURE: 97.6 F | DIASTOLIC BLOOD PRESSURE: 69 MMHG | HEIGHT: 77 IN | RESPIRATION RATE: 18 BRPM

## 2022-09-07 PROCEDURE — 99204 OFFICE O/P NEW MOD 45 MIN: CPT

## 2022-09-14 ENCOUNTER — APPOINTMENT (OUTPATIENT)
Dept: HEMATOLOGY ONCOLOGY | Facility: CLINIC | Age: 72
End: 2022-09-14

## 2022-09-14 VITALS
BODY MASS INDEX: 24.09 KG/M2 | HEART RATE: 77 BPM | HEIGHT: 77 IN | SYSTOLIC BLOOD PRESSURE: 108 MMHG | DIASTOLIC BLOOD PRESSURE: 58 MMHG | OXYGEN SATURATION: 95 % | TEMPERATURE: 96.6 F | WEIGHT: 204 LBS

## 2022-09-14 DIAGNOSIS — R79.89 OTHER SPECIFIED ABNORMAL FINDINGS OF BLOOD CHEMISTRY: ICD-10-CM

## 2022-09-14 LAB
APTT BLD: 40.8 SEC
INR PPP: 1.11
PT BLD: 13.2 SEC

## 2022-09-14 PROCEDURE — 36415 COLL VENOUS BLD VENIPUNCTURE: CPT

## 2022-09-14 PROCEDURE — 99214 OFFICE O/P EST MOD 30 MIN: CPT | Mod: 25

## 2022-09-14 NOTE — PHYSICAL EXAM
[Normal] : affect appropriate [de-identified] : varicosities in the lower extremities [de-identified] : no ecchymoses or petechiae upper extremities, fading ecchymosis right posterior thigh

## 2022-09-14 NOTE — ASSESSMENT
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 Recommended again that patient  undergo testing for bone density, as his long term use of Lovenox places him at higher risk of developing osteoporosis. He will continue taking sublingual B12. Have ordered CBC, CMP, PT/INR, and APTT , B12 and folate.  Have also ordered a bone density DEXA scan.  Venipuncture was performed in the office today. Lovenox dosage adjustment to be considered due to patient's age and pending renal function. Patient advised to call next week to discuss results and next appointment date. \par \par  \par

## 2022-09-14 NOTE — HISTORY OF PRESENT ILLNESS
[de-identified] : \par \par Patient is a 72 year old male with a history of aortic aneurysm, GERD, hyperlipidemia, prostate cancer in 2005, s/p surgical prostatectomy, Factor V Leiden mutation, and  recurrent  (3) DVTs of the left lower extremity, elevated homocysteine level, B12 deficiency who now presents for hematologic follow-up.. He was last seen in office in May 2022. In May of 2021 he developed left lower extremity pain and the Doppler revealed a DVT from midthigh to knee. This was his third DVT in the LLE, which occurred while on Xarelto, with the previous episode occurring 8 years prior. Patient was discharged from the ER at that time on Lovenox, which he had previously been on for 7 years and tolerated well and on which he remains. Patient has been resistant to Coumadin in the past. He also does not tolerate Eliquis (previously caused a pruritic rash). He follows with , vascular surgeon.  Patient recently saw Dr. Joyce for follow-up of his aortic aneurysm with no indication for surgical intervention at the present time.  Patient states he is feeling generally well, with no acute complaints. He recently changed to a new PCP and recent blood work revealed a creatinine of 1.7, BUN 27 and a CBC that was significant for a platelet count of 142,000.   The patient has not yet had a bone density as recommended after his last 2 visits. Denies pain in LLE, excessive bruising, any bleeding, recent infection, fever, chills, or sweats. \par  \par

## 2022-09-14 NOTE — REVIEW OF SYSTEMS
[Recent Change In Weight] : ~T recent weight change [Negative] : Allergic/Immunologic [Fever] : no fever [Chills] : no chills [Night Sweats] : no night sweats [Fatigue] : no fatigue [Chest Pain] : no chest pain [Palpitations] : no palpitations [Shortness Of Breath] : no shortness of breath [Abdominal Pain] : no abdominal pain [Joint Pain] : no joint pain [Joint Stiffness] : no joint stiffness [Skin Rash] : no skin rash [Easy Bleeding] : no tendency for easy bleeding [Easy Bruising] : no tendency for easy bruising [FreeTextEntry2] : Lost 14 pounds intentionally [de-identified] : Brief numbness at left thigh injection site completely resolved.

## 2022-09-15 LAB
ALBUMIN SERPL ELPH-MCNC: 4.9 G/DL
ALP BLD-CCNC: 63 U/L
ALT SERPL-CCNC: 10 U/L
ANION GAP SERPL CALC-SCNC: 16 MMOL/L
AST SERPL-CCNC: 20 U/L
BASOPHILS # BLD AUTO: 0.08 K/UL
BASOPHILS # BLD AUTO: 0.08 K/UL
BASOPHILS NFR BLD AUTO: 1.8 %
BASOPHILS NFR BLD AUTO: 1.8 %
BILIRUB SERPL-MCNC: 0.5 MG/DL
BUN SERPL-MCNC: 30 MG/DL
CALCIUM SERPL-MCNC: 9.7 MG/DL
CHLORIDE SERPL-SCNC: 107 MMOL/L
CO2 SERPL-SCNC: 20 MMOL/L
CREAT SERPL-MCNC: 1.7 MG/DL
EGFR: 42 ML/MIN/1.73M2
EOSINOPHIL # BLD AUTO: 0.04 K/UL
EOSINOPHIL # BLD AUTO: 0.04 K/UL
EOSINOPHIL NFR BLD AUTO: 0.9 %
EOSINOPHIL NFR BLD AUTO: 0.9 %
FOLATE SERPL-MCNC: >20 NG/ML
GIANT PLATELETS BLD QL SMEAR: PRESENT
GLUCOSE SERPL-MCNC: 113 MG/DL
HCT VFR BLD CALC: 43.5 %
HCYS SERPL-MCNC: 17.4 UMOL/L
HGB BLD-MCNC: 13.8 G/DL
LYMPHOCYTES # BLD AUTO: 1.16 K/UL
LYMPHOCYTES # BLD AUTO: 1.16 K/UL
LYMPHOCYTES NFR BLD AUTO: 26.3 %
LYMPHOCYTES NFR BLD AUTO: 26.3 %
MAN DIFF?: NORMAL
MCHC RBC-ENTMCNC: 30.7 PG
MCHC RBC-ENTMCNC: 31.7 GM/DL
MCV RBC AUTO: 96.7 FL
MONOCYTES # BLD AUTO: 0.19 K/UL
MONOCYTES # BLD AUTO: 0.19 K/UL
MONOCYTES NFR BLD AUTO: 4.4 %
MONOCYTES NFR BLD AUTO: 4.4 %
MSMEAR: NORMAL
NEUTROPHILS # BLD AUTO: 2.94 K/UL
NEUTROPHILS # BLD AUTO: 2.94 K/UL
NEUTROPHILS NFR BLD AUTO: 64 %
NEUTROPHILS NFR BLD AUTO: 64 %
NEUTS BAND NFR BLD MANUAL: 2.6 %
PLAT MORPH BLD: ABNORMAL
PLATELET # BLD AUTO: 159 K/UL
POTASSIUM SERPL-SCNC: 4.7 MMOL/L
PROT SERPL-MCNC: 7 G/DL
RBC # BLD: 4.5 M/UL
RBC # FLD: 13.9 %
RBC BLD AUTO: NORMAL
SODIUM SERPL-SCNC: 143 MMOL/L
VIT B12 SERPL-MCNC: 419 PG/ML
WBC # FLD AUTO: 4.42 K/UL

## 2022-09-16 ENCOUNTER — APPOINTMENT (OUTPATIENT)
Dept: RADIOLOGY | Facility: CLINIC | Age: 72
End: 2022-09-16

## 2022-09-16 ENCOUNTER — OUTPATIENT (OUTPATIENT)
Dept: OUTPATIENT SERVICES | Facility: HOSPITAL | Age: 72
LOS: 1 days | End: 2022-09-16

## 2022-09-19 ENCOUNTER — RESULT REVIEW (OUTPATIENT)
Age: 72
End: 2022-09-19

## 2022-09-19 PROCEDURE — 77080 DXA BONE DENSITY AXIAL: CPT | Mod: 26

## 2022-09-26 ENCOUNTER — NON-APPOINTMENT (OUTPATIENT)
Age: 72
End: 2022-09-26

## 2022-11-02 ENCOUNTER — APPOINTMENT (OUTPATIENT)
Dept: HEMATOLOGY ONCOLOGY | Facility: CLINIC | Age: 72
End: 2022-11-02

## 2022-11-02 VITALS
BODY MASS INDEX: 23.62 KG/M2 | DIASTOLIC BLOOD PRESSURE: 68 MMHG | SYSTOLIC BLOOD PRESSURE: 110 MMHG | HEART RATE: 69 BPM | WEIGHT: 200 LBS | OXYGEN SATURATION: 98 % | TEMPERATURE: 96.3 F | HEIGHT: 77 IN

## 2022-11-02 DIAGNOSIS — R79.89 OTHER SPECIFIED ABNORMAL FINDINGS OF BLOOD CHEMISTRY: ICD-10-CM

## 2022-11-02 DIAGNOSIS — Z86.718 PERSONAL HISTORY OF OTHER VENOUS THROMBOSIS AND EMBOLISM: ICD-10-CM

## 2022-11-02 LAB
APTT BLD: 41.2 SEC
INR PPP: 1.1
PT BLD: 13.1 SEC

## 2022-11-02 PROCEDURE — 36415 COLL VENOUS BLD VENIPUNCTURE: CPT

## 2022-11-02 PROCEDURE — 99214 OFFICE O/P EST MOD 30 MIN: CPT | Mod: 25

## 2022-11-02 NOTE — REVIEW OF SYSTEMS
[Fatigue] : fatigue [Constipation] : constipation [Negative] : Allergic/Immunologic [Fever] : no fever [Chills] : no chills [Night Sweats] : no night sweats [Recent Change In Weight] : ~T no recent weight change [Chest Pain] : no chest pain [Shortness Of Breath] : no shortness of breath [Joint Pain] : no joint pain [Easy Bleeding] : no tendency for easy bleeding [Easy Bruising] : no tendency for easy bruising [FreeTextEntry7] : Having colonoscopy for severe constipation

## 2022-11-02 NOTE — HISTORY OF PRESENT ILLNESS
[de-identified] : Patient is a 72 year old male with a history of aortic aneurysm, GERD, hyperlipidemia, prostate cancer in 2005, s/p surgical prostatectomy, Factor V Leiden mutation, and recurrent (3) DVTs of the left lower extremity, elevated homocysteine level, B12 deficiency who now presents for hematologic follow-up for recommendations regarding his anticoagulation prior to colonoscopy.  Due to patient's history of recurrent DVTs, (one of which occurred while on Xarelto)  he remains  on Lovenox, which he tolerates well. Patient has been resistant to Coumadin in the past. He also does not tolerate Eliquis (previously caused a pruritic rash). He follows with , vascular surgeon. Blood results from the last visit revealed that the comprehensive metabolic panel was significant for a BUN of 30 and a creatinine of 1.7. Creatinine was also 1.7 when patient saw his new internist recently. Homocystine was slightly elevated at 17.4. B12 was 419 and folate was greater than 20. The CBC was completely normal. INR was normal and PTT was slightly elevated likely due to patient's chronic use of Lovenox. Patient's bone density exam was completely normal without any report of osteopenia or osteoporosis. Patient states that since the last visit, he has had an echocardiogram and visited Dr. Joyce (aortic specialist) to manage his aortic aneurysm, with no indication for surgical intervention at that time. He states he has been seeing his physical therapist regularly. Today, the patient complains of severe constipation that started about a month ago and slight fatigue. Patient tried drinking more fluids and took probiotics without improvement in his symptoms.  Patient has a scheduled colonoscopy with Dr. Salazar (gastroenterologist) on November 21st. Denies chest pain, excessive bruising, any bleeding, recent infection, fever, chills, or sweats. \par

## 2022-11-02 NOTE — REASON FOR VISIT
[Follow-Up Visit] : a follow-up visit for [FreeTextEntry2] : History of recurrent DVT of the lower extremity, factor V Leiden mutation, positive lupus anticoagulant, hypercoagulable state, low B12 level

## 2022-11-02 NOTE — ASSESSMENT
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  He will continue taking sublingual B12 pending blood test results. Have ordered Activated Partial Thromboplastin Time, B12 and folate, CBC with differential, CMP, manual differential, and PT/INR. Venipuncture was performed in the office today.  Patient is scheduled for colonoscopy on November 21.  The patient was advised to take his last dose of Lovenox early in the morning the day prior to his colonoscopy and to have colonoscopy scheduled later in the day on November 21, if possible.  When to resume Lovenox will be up to the discretion of patient's gastroenterologist.Advised patient to call next week to discuss results and next appointment date.  Patient will see the gastroenterologist in consultation one week prior to the colonoscopy and have advised that the gastroenterologist call should he have any questions.\par

## 2022-11-02 NOTE — PHYSICAL EXAM
[Normal] : affect appropriate [de-identified] : varicosities in the lower extremities [de-identified] : no ecchymoses or petechiae on extremities

## 2022-11-03 LAB
ALBUMIN SERPL ELPH-MCNC: 4.4 G/DL
ALP BLD-CCNC: 61 U/L
ALT SERPL-CCNC: 14 U/L
ANION GAP SERPL CALC-SCNC: 11 MMOL/L
AST SERPL-CCNC: 25 U/L
BASOPHILS # BLD AUTO: 0.11 K/UL
BASOPHILS # BLD AUTO: 0.11 K/UL
BASOPHILS NFR BLD AUTO: 2.6 %
BASOPHILS NFR BLD AUTO: 2.6 %
BILIRUB SERPL-MCNC: 0.6 MG/DL
BUN SERPL-MCNC: 20 MG/DL
CALCIUM SERPL-MCNC: 9.8 MG/DL
CHLORIDE SERPL-SCNC: 105 MMOL/L
CO2 SERPL-SCNC: 23 MMOL/L
CREAT SERPL-MCNC: 1.59 MG/DL
EGFR: 45 ML/MIN/1.73M2
EOSINOPHIL # BLD AUTO: 0 K/UL
EOSINOPHIL # BLD AUTO: 0 K/UL
EOSINOPHIL NFR BLD AUTO: 0 %
EOSINOPHIL NFR BLD AUTO: 0 %
FOLATE SERPL-MCNC: >20 NG/ML
GLUCOSE SERPL-MCNC: 65 MG/DL
HCT VFR BLD CALC: 41.3 %
HGB BLD-MCNC: 13.1 G/DL
HYPOCHROMIA BLD QL SMEAR: SLIGHT
LYMPHOCYTES # BLD AUTO: 1.09 K/UL
LYMPHOCYTES # BLD AUTO: 1.09 K/UL
LYMPHOCYTES NFR BLD AUTO: 25.2 %
LYMPHOCYTES NFR BLD AUTO: 25.2 %
MAN DIFF?: NORMAL
MCHC RBC-ENTMCNC: 30.5 PG
MCHC RBC-ENTMCNC: 31.7 GM/DL
MCV RBC AUTO: 96.3 FL
MONOCYTES # BLD AUTO: 0.3 K/UL
MONOCYTES # BLD AUTO: 0.3 K/UL
MONOCYTES NFR BLD AUTO: 7 %
MONOCYTES NFR BLD AUTO: 7 %
MSMEAR: NORMAL
NEUTROPHILS # BLD AUTO: 2.81 K/UL
NEUTROPHILS # BLD AUTO: 2.81 K/UL
NEUTROPHILS NFR BLD AUTO: 65.2 %
NEUTROPHILS NFR BLD AUTO: 65.2 %
OVALOCYTES BLD QL SMEAR: SLIGHT
PLAT MORPH BLD: NORMAL
PLATELET # BLD AUTO: 176 K/UL
POIKILOCYTOSIS BLD QL SMEAR: SLIGHT
POTASSIUM SERPL-SCNC: 4.5 MMOL/L
PROT SERPL-MCNC: 6.9 G/DL
RBC # BLD: 4.29 M/UL
RBC # FLD: 14.1 %
RBC BLD AUTO: ABNORMAL
SODIUM SERPL-SCNC: 140 MMOL/L
VIT B12 SERPL-MCNC: 723 PG/ML
WBC # FLD AUTO: 4.31 K/UL

## 2022-11-09 ENCOUNTER — APPOINTMENT (OUTPATIENT)
Dept: HEART AND VASCULAR | Facility: CLINIC | Age: 72
End: 2022-11-09

## 2022-11-21 ENCOUNTER — NON-APPOINTMENT (OUTPATIENT)
Age: 72
End: 2022-11-21

## 2022-11-22 ENCOUNTER — NON-APPOINTMENT (OUTPATIENT)
Age: 72
End: 2022-11-22

## 2023-01-17 ENCOUNTER — NON-APPOINTMENT (OUTPATIENT)
Age: 73
End: 2023-01-17

## 2023-02-02 ENCOUNTER — APPOINTMENT (OUTPATIENT)
Dept: HEART AND VASCULAR | Facility: CLINIC | Age: 73
End: 2023-02-02

## 2023-02-06 ENCOUNTER — NON-APPOINTMENT (OUTPATIENT)
Age: 73
End: 2023-02-06

## 2023-02-15 ENCOUNTER — APPOINTMENT (OUTPATIENT)
Dept: HEART AND VASCULAR | Facility: CLINIC | Age: 73
End: 2023-02-15
Payer: MEDICARE

## 2023-02-15 ENCOUNTER — NON-APPOINTMENT (OUTPATIENT)
Age: 73
End: 2023-02-15

## 2023-02-15 VITALS
WEIGHT: 191 LBS | HEART RATE: 58 BPM | OXYGEN SATURATION: 98 % | HEIGHT: 77 IN | TEMPERATURE: 96.4 F | DIASTOLIC BLOOD PRESSURE: 64 MMHG | BODY MASS INDEX: 22.55 KG/M2 | SYSTOLIC BLOOD PRESSURE: 101 MMHG

## 2023-02-15 PROCEDURE — 93000 ELECTROCARDIOGRAM COMPLETE: CPT

## 2023-02-15 PROCEDURE — 99214 OFFICE O/P EST MOD 30 MIN: CPT | Mod: 25

## 2023-02-28 ENCOUNTER — APPOINTMENT (OUTPATIENT)
Dept: VASCULAR SURGERY | Facility: CLINIC | Age: 73
End: 2023-02-28

## 2023-04-12 ENCOUNTER — EMERGENCY (EMERGENCY)
Facility: HOSPITAL | Age: 73
LOS: 1 days | Discharge: HOME | End: 2023-04-12
Attending: EMERGENCY MEDICINE | Admitting: EMERGENCY MEDICINE
Payer: MEDICARE

## 2023-04-12 ENCOUNTER — APPOINTMENT (OUTPATIENT)
Dept: HEMATOLOGY ONCOLOGY | Facility: CLINIC | Age: 73
End: 2023-04-12
Payer: MEDICARE

## 2023-04-12 ENCOUNTER — APPOINTMENT (OUTPATIENT)
Dept: HEART AND VASCULAR | Facility: CLINIC | Age: 73
End: 2023-04-12
Payer: MEDICARE

## 2023-04-12 VITALS
SYSTOLIC BLOOD PRESSURE: 90 MMHG | TEMPERATURE: 97.8 F | OXYGEN SATURATION: 98 % | BODY MASS INDEX: 21.98 KG/M2 | HEART RATE: 61 BPM | HEIGHT: 78 IN | DIASTOLIC BLOOD PRESSURE: 63 MMHG | WEIGHT: 190 LBS

## 2023-04-12 VITALS
OXYGEN SATURATION: 98 % | BODY MASS INDEX: 21.98 KG/M2 | SYSTOLIC BLOOD PRESSURE: 106 MMHG | DIASTOLIC BLOOD PRESSURE: 62 MMHG | TEMPERATURE: 97.6 F | HEIGHT: 78 IN | HEART RATE: 68 BPM | WEIGHT: 190 LBS

## 2023-04-12 VITALS
DIASTOLIC BLOOD PRESSURE: 70 MMHG | OXYGEN SATURATION: 96 % | SYSTOLIC BLOOD PRESSURE: 130 MMHG | TEMPERATURE: 98 F | RESPIRATION RATE: 18 BRPM | HEART RATE: 62 BPM

## 2023-04-12 VITALS
HEART RATE: 53 BPM | RESPIRATION RATE: 18 BRPM | DIASTOLIC BLOOD PRESSURE: 65 MMHG | HEIGHT: 75 IN | TEMPERATURE: 99 F | WEIGHT: 192.02 LBS | OXYGEN SATURATION: 95 % | SYSTOLIC BLOOD PRESSURE: 106 MMHG

## 2023-04-12 VITALS — SYSTOLIC BLOOD PRESSURE: 84 MMHG | DIASTOLIC BLOOD PRESSURE: 60 MMHG

## 2023-04-12 DIAGNOSIS — Z87.19 PERSONAL HISTORY OF OTHER DISEASES OF THE DIGESTIVE SYSTEM: ICD-10-CM

## 2023-04-12 DIAGNOSIS — R53.1 WEAKNESS: ICD-10-CM

## 2023-04-12 DIAGNOSIS — Z86.718 PERSONAL HISTORY OF OTHER VENOUS THROMBOSIS AND EMBOLISM: ICD-10-CM

## 2023-04-12 DIAGNOSIS — R63.0 ANOREXIA: ICD-10-CM

## 2023-04-12 DIAGNOSIS — I25.10 ATHEROSCLEROTIC HEART DISEASE OF NATIVE CORONARY ARTERY WITHOUT ANGINA PECTORIS: ICD-10-CM

## 2023-04-12 DIAGNOSIS — Z79.01 LONG TERM (CURRENT) USE OF ANTICOAGULANTS: ICD-10-CM

## 2023-04-12 DIAGNOSIS — E78.5 HYPERLIPIDEMIA, UNSPECIFIED: ICD-10-CM

## 2023-04-12 DIAGNOSIS — M32.9 SYSTEMIC LUPUS ERYTHEMATOSUS, UNSPECIFIED: ICD-10-CM

## 2023-04-12 DIAGNOSIS — Z85.46 PERSONAL HISTORY OF MALIGNANT NEOPLASM OF PROSTATE: ICD-10-CM

## 2023-04-12 DIAGNOSIS — Z90.79 ACQUIRED ABSENCE OF OTHER GENITAL ORGAN(S): ICD-10-CM

## 2023-04-12 DIAGNOSIS — I10 ESSENTIAL (PRIMARY) HYPERTENSION: ICD-10-CM

## 2023-04-12 DIAGNOSIS — D68.51 ACTIVATED PROTEIN C RESISTANCE: ICD-10-CM

## 2023-04-12 DIAGNOSIS — R00.1 BRADYCARDIA, UNSPECIFIED: ICD-10-CM

## 2023-04-12 DIAGNOSIS — E72.11 HOMOCYSTINURIA: ICD-10-CM

## 2023-04-12 DIAGNOSIS — Z86.79 PERSONAL HISTORY OF OTHER DISEASES OF THE CIRCULATORY SYSTEM: ICD-10-CM

## 2023-04-12 DIAGNOSIS — Z20.822 CONTACT WITH AND (SUSPECTED) EXPOSURE TO COVID-19: ICD-10-CM

## 2023-04-12 LAB
ANION GAP SERPL CALC-SCNC: 9 MMOL/L — SIGNIFICANT CHANGE UP (ref 5–17)
BASOPHILS # BLD AUTO: 0.06 K/UL — SIGNIFICANT CHANGE UP (ref 0–0.2)
BASOPHILS NFR BLD AUTO: 1.2 % — SIGNIFICANT CHANGE UP (ref 0–2)
BUN SERPL-MCNC: 22 MG/DL — SIGNIFICANT CHANGE UP (ref 7–23)
CALCIUM SERPL-MCNC: 9.3 MG/DL — SIGNIFICANT CHANGE UP (ref 8.4–10.5)
CHLORIDE SERPL-SCNC: 106 MMOL/L — SIGNIFICANT CHANGE UP (ref 96–108)
CO2 SERPL-SCNC: 27 MMOL/L — SIGNIFICANT CHANGE UP (ref 22–31)
CREAT SERPL-MCNC: 1.43 MG/DL — HIGH (ref 0.5–1.3)
EGFR: 51 ML/MIN/1.73M2 — LOW
EOSINOPHIL # BLD AUTO: 0.02 K/UL — SIGNIFICANT CHANGE UP (ref 0–0.5)
EOSINOPHIL NFR BLD AUTO: 0.4 % — SIGNIFICANT CHANGE UP (ref 0–6)
FLUAV AG NPH QL: SIGNIFICANT CHANGE UP
FLUBV AG NPH QL: SIGNIFICANT CHANGE UP
GLUCOSE SERPL-MCNC: 89 MG/DL — SIGNIFICANT CHANGE UP (ref 70–99)
HCT VFR BLD CALC: 39.5 % — SIGNIFICANT CHANGE UP (ref 39–50)
HGB BLD-MCNC: 12.7 G/DL — LOW (ref 13–17)
IMM GRANULOCYTES NFR BLD AUTO: 0.4 % — SIGNIFICANT CHANGE UP (ref 0–0.9)
LYMPHOCYTES # BLD AUTO: 1 K/UL — SIGNIFICANT CHANGE UP (ref 1–3.3)
LYMPHOCYTES # BLD AUTO: 20.7 % — SIGNIFICANT CHANGE UP (ref 13–44)
MAGNESIUM SERPL-MCNC: 1.8 MG/DL — SIGNIFICANT CHANGE UP (ref 1.6–2.6)
MCHC RBC-ENTMCNC: 31 PG — SIGNIFICANT CHANGE UP (ref 27–34)
MCHC RBC-ENTMCNC: 32.2 GM/DL — SIGNIFICANT CHANGE UP (ref 32–36)
MCV RBC AUTO: 96.3 FL — SIGNIFICANT CHANGE UP (ref 80–100)
MONOCYTES # BLD AUTO: 0.38 K/UL — SIGNIFICANT CHANGE UP (ref 0–0.9)
MONOCYTES NFR BLD AUTO: 7.9 % — SIGNIFICANT CHANGE UP (ref 2–14)
NEUTROPHILS # BLD AUTO: 3.35 K/UL — SIGNIFICANT CHANGE UP (ref 1.8–7.4)
NEUTROPHILS NFR BLD AUTO: 69.4 % — SIGNIFICANT CHANGE UP (ref 43–77)
NRBC # BLD: 0 /100 WBCS — SIGNIFICANT CHANGE UP (ref 0–0)
PLATELET # BLD AUTO: 143 K/UL — LOW (ref 150–400)
POTASSIUM SERPL-MCNC: 4.8 MMOL/L — SIGNIFICANT CHANGE UP (ref 3.5–5.3)
POTASSIUM SERPL-SCNC: 4.8 MMOL/L — SIGNIFICANT CHANGE UP (ref 3.5–5.3)
RBC # BLD: 4.1 M/UL — LOW (ref 4.2–5.8)
RBC # FLD: 14 % — SIGNIFICANT CHANGE UP (ref 10.3–14.5)
RSV RNA NPH QL NAA+NON-PROBE: SIGNIFICANT CHANGE UP
SARS-COV-2 RNA SPEC QL NAA+PROBE: SIGNIFICANT CHANGE UP
SODIUM SERPL-SCNC: 142 MMOL/L — SIGNIFICANT CHANGE UP (ref 135–145)
WBC # BLD: 4.83 K/UL — SIGNIFICANT CHANGE UP (ref 3.8–10.5)
WBC # FLD AUTO: 4.83 K/UL — SIGNIFICANT CHANGE UP (ref 3.8–10.5)

## 2023-04-12 PROCEDURE — 99214 OFFICE O/P EST MOD 30 MIN: CPT | Mod: 25

## 2023-04-12 PROCEDURE — 80048 BASIC METABOLIC PNL TOTAL CA: CPT

## 2023-04-12 PROCEDURE — 99283 EMERGENCY DEPT VISIT LOW MDM: CPT | Mod: 25

## 2023-04-12 PROCEDURE — 99284 EMERGENCY DEPT VISIT MOD MDM: CPT

## 2023-04-12 PROCEDURE — 36415 COLL VENOUS BLD VENIPUNCTURE: CPT

## 2023-04-12 PROCEDURE — 99214 OFFICE O/P EST MOD 30 MIN: CPT

## 2023-04-12 PROCEDURE — 85025 COMPLETE CBC W/AUTO DIFF WBC: CPT

## 2023-04-12 PROCEDURE — 93005 ELECTROCARDIOGRAM TRACING: CPT

## 2023-04-12 PROCEDURE — 87637 SARSCOV2&INF A&B&RSV AMP PRB: CPT

## 2023-04-12 PROCEDURE — 83735 ASSAY OF MAGNESIUM: CPT

## 2023-04-12 RX ORDER — SODIUM CHLORIDE 9 MG/ML
1000 INJECTION INTRAMUSCULAR; INTRAVENOUS; SUBCUTANEOUS ONCE
Refills: 0 | Status: COMPLETED | OUTPATIENT
Start: 2023-04-12 | End: 2023-04-12

## 2023-04-12 RX ADMIN — SODIUM CHLORIDE 2000 MILLILITER(S): 9 INJECTION INTRAMUSCULAR; INTRAVENOUS; SUBCUTANEOUS at 17:21

## 2023-04-12 RX ADMIN — SODIUM CHLORIDE 1000 MILLILITER(S): 9 INJECTION INTRAMUSCULAR; INTRAVENOUS; SUBCUTANEOUS at 18:40

## 2023-04-12 NOTE — REASON FOR VISIT
[Follow-Up Visit] : a follow-up visit for [FreeTextEntry2] : History of recurrent DVT of the lower extremity, hypercoagulable state, factor V Leiden mutation, positive lupus anticoagulant, low B12 level, hyper homocystinemia, chronic anticoagulation

## 2023-04-12 NOTE — REVIEW OF SYSTEMS
[Fatigue] : fatigue [SOB on Exertion] : shortness of breath during exertion [Constipation] : constipation [Negative] : Allergic/Immunologic [Fever] : no fever [Chills] : no chills [Night Sweats] : no night sweats [Vision Problems] : no vision problems [Nosebleeds] : no nosebleeds [Chest Pain] : no chest pain [Palpitations] : no palpitations [Abdominal Pain] : no abdominal pain [Joint Pain] : no joint pain [Joint Stiffness] : no joint stiffness [Dizziness] : no dizziness [Easy Bleeding] : no tendency for easy bleeding [Easy Bruising] : no tendency for easy bruising [FreeTextEntry2] : Lost 10 pounds since last visit [FreeTextEntry7] : Recent colonoscopy without malignancy [de-identified] : Patient complains of neuropathy in his lower extremities

## 2023-04-12 NOTE — ED PROVIDER NOTE - PHYSICAL EXAMINATION
Vitals reviewed  Gen: comfortable appearing, nad, speaking in full sentences  Skin: wwp, no rash/lesions  HEENT: ncat, eomi, dry oral mucosa   CV: rrr, no audible m/r/g  Resp: symmetrical expansion, ctab, no w/r/r  Abd: nondistended, soft/nt, no r/g   Ext: FROM throughout, no peripheral edema, distal pulses intact   Neuro: alert/oriented x3, no focal deficits, steady gait, no ataxia

## 2023-04-12 NOTE — ED PROVIDER NOTE - ATTENDING APP SHARED VISIT CONTRIBUTION OF CARE
72 M pmh aortic aneurysm, nonobstructive CAD (CCTA 2022), GERD, hyperlipidemia, prostate cancer in 2005, s/p surgical prostatectomy, Factor V Leiden mutation, positive lupus anticoagulant, hypercoagulable state recurrent DVT LLE sent from Dr. Salter office for poor PO intake and generalized weakness; found to be orthostatic in cards office- sent for IV hydration.  borderline BP, afebrile, comfortable appearing, appears dehydrated, lungs ctab, hrrr, abd soft/nt, neuro intact w/ steady gait.  will obtain orthostatics again although + in cards office, labs, ekg and give ivf.  does not wish to speak w/ psych, NO SI/HI so ok to arrange outpt appt    Agree with above note as documented by PA.  I was available as the supervising attending during patient's ER evaluation.    labs with improved baseline Creatinine, received 2 L NS and feeling better in ED requesting discharge.  Understands dc instructions and follow up precautions.

## 2023-04-12 NOTE — ED PROVIDER NOTE - NSFOLLOWUPINSTRUCTIONS_ED_ALL_ED_FT
Please rest and remain well hydrated with plenty of fluids.     Please call to arrange follow up with primary care doctor within one week    Orthostatic Hypotension  Blood pressure is a measurement of how strongly, or weakly, your circulating blood is pressing against the walls of your arteries. Orthostatic hypotension is a drop in blood pressure that can happen when you change positions, such as when you go from lying down to standing.    Arteries are blood vessels that carry blood from your heart throughout your body. When blood pressure is too low, you may not get enough blood to your brain or to the rest of your organs. Orthostatic hypotension can cause light-headedness, sweating, rapid heartbeat, blurred vision, and fainting. These symptoms require further investigation into the cause.    What are the causes?  Orthostatic hypotension can be caused by many things, including:  Sudden changes in posture, such as standing up quickly after you have been sitting or lying down.  Loss of blood (anemia) or loss of body fluids (dehydration).  Heart problems, neurologic problems, or hormone problems.  Pregnancy.  Aging. The risk for this condition increases as you get older.  Severe infection (sepsis).  Certain medicines, such as medicines for high blood pressure or medicines that make the body lose excess fluids (diuretics).  What are the signs or symptoms?  Symptoms of this condition may include:  Weakness, light-headedness, or dizziness.  Sweating.  Blurred vision.  Tiredness (fatigue).  Rapid heartbeat.  Fainting, in severe cases.  How is this diagnosed?  This condition is diagnosed based on:  Your symptoms and medical history.  Your blood pressure measurements. Your health care provider will check your blood pressure when you are:  Lying down.  Sitting.  Standing.  A blood pressure reading is recorded as two numbers, such as "120 over 80" (or 120/80). The first ("top") number is called the systolic pressure. It is a measure of the pressure in your arteries as your heart beats. The second ("bottom") number is called the diastolic pressure. It is a measure of the pressure in your arteries when your heart relaxes between beats. Blood pressure is measured in a unit called mmHg. Healthy blood pressure for most adults is 120/80 mmHg. Orthostatic hypotension is defined as a 20 mmHg drop in systolic pressure or a 10 mmHg drop in diastolic pressure within 3 minutes of standing.    Other information or tests that may be used to diagnose orthostatic hypotension include:  Your other vital signs, such as your heart rate and temperature.  Blood tests.  An electrocardiogram (ECG) or echocardiogram.  A Holter monitor. This is a device you wear that records your heart rhythm continuously, usually for 24–48 hours.  Tilt table test. For this test, you will be safely secured to a table that moves you from a lying position to an upright position. Your heart rhythm and blood pressure will be monitored during the test.  How is this treated?  This condition may be treated by:  Changing your diet. This may involve eating more salt (sodium) or drinking more water.  Changing the dosage of certain medicines you are taking that might be lowering your blood pressure.  Correcting the underlying reason for the orthostatic hypotension.  Wearing compression stockings.  Taking medicines to raise your blood pressure.  Avoiding actions that trigger symptoms.  Follow these instructions at home:  Medicines    Take over-the-counter and prescription medicines only as told by your health care provider.  Follow instructions from your health care provider about changing the dosage of your current medicines, if this applies.  Do not stop or adjust any of your medicines on your own.  Eating and drinking    Illustration of a person drinking a glass of water.  Drink enough fluid to keep your urine pale yellow.  Eat extra salt only as directed. Do not add extra salt to your diet unless advised by your health care provider.  Eat frequent, small meals.  Avoid standing up suddenly after eating.  General instructions    Compression stockings on a person's lower legs.  Get up slowly from lying down or sitting positions. This gives your blood pressure a chance to adjust.  Avoid hot showers and excessive heat as directed by your health care provider.  Engage in regular physical activity as directed by your health care provider.  If you have compression stockings, wear them as told.  Keep all follow-up visits. This is important.  Contact a health care provider if:  You have a fever for more than 2–3 days.  You feel more thirsty than usual.  You feel dizzy or weak.  Get help right away if:  You have chest pain.  You have a fast or irregular heartbeat.  You become sweaty or feel light-headed.  You feel short of breath.  You faint.  You have any symptoms of a stroke. "BE FAST" is an easy way to remember the main warning signs of a stroke:  B - Balance. Signs are dizziness, sudden trouble walking, or loss of balance.  E - Eyes. Signs are trouble seeing or a sudden change in vision.  F - Face. Signs are sudden weakness or numbness of the face, or the face or eyelid drooping on one side.  A - Arms. Signs are weakness or numbness in an arm. This happens suddenly and usually on one side of the body.  S - Speech. Signs are sudden trouble speaking, slurred speech, or trouble understanding what people say.  T - Time. Time to call emergency services. Write down what time symptoms started.  You have other signs of a stroke, such as:  A sudden, severe headache with no known cause.  Nausea or vomiting.  Seizure.  These symptoms may represent a serious problem that is an emergency. Do not wait to see if the symptoms will go away. Get medical help right away. Call your local emergency services (911 in the [place] Do not drive yourself to the hospital.    Summary  Orthostatic hypotension is a sudden drop in blood pressure.  It can cause light-headedness, sweating, rapid heartbeat, blurred vision, and fainting.  Orthostatic hypotension can be diagnosed by having your blood pressure taken while lying down, sitting, and then standing.  Treatment may involve changing your diet, wearing compression stockings, sitting up slowly, adjusting your medicines, or correcting the underlying reason for the orthostatic hypotension.  Get help right away if you have chest pain, a fast or irregular heartbeat, or symptoms of a stroke.  This information is not intended to replace advice given to you by your health care provider. Make sure you discuss any questions you have with your health care provider.

## 2023-04-12 NOTE — REASON FOR VISIT
[Symptom and Test Evaluation] : symptom and test evaluation [Hyperlipidemia] : hyperlipidemia [FreeTextEntry1] : Diagnostic Test:\par ----------------------------------\par ECG:\par 02/15/2023:  Sinus bradycardia, 56 bpm, left axis, LAFB\par 07/27/2022: Sinus bradycardia, 59 bpm, left axis, LAFB, anterior infarct (age undetermined)\par ----------------------------------\par Echo:\par 0/04/2015: EF 55-60%. Moderate aortic root dilation (4.9 cm) . Ascending aorta is dilated (4.5 cm) and aortic arch is dialted (4.6cm). Nml LVSF. Mild MR\par -----------------------------------\par CCTA:\par 07/19/2022: Ca+ 62.  LM nml. P/MLAD ~30%, pLCx focal calcified plaque ~50%, pRCA 50-60% and mRCA 40-50% stenosis. \par \par Aneurysmal aortic root and ascending aorta, extending to the proximal aortic arch.\par Sinus of Valsalva  4.5 cm\par Ascending Aorta   5.0 cm\par Prox Aortic arch    4.5 cm\par -----------------------------------\par Holter:\par 2/11/2019: Rare PVCs. No arrhythmias\par

## 2023-04-12 NOTE — HISTORY OF PRESENT ILLNESS
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 Resistant to Coumadin in the past, does not tolerate Eliquis on lovenox. He follows with , vascular surgeon. Pt had a sinus infection a few weeks ago accompanied by fatigued. Infection has resolved but now still with fatigue, new onset SOB, and chest discomfort. Presently denies, LE edema, dizziness, palpitations, syncope or near syncope. He walks numerous miles daily and LANDRY up to 5 flights of stairs. \par \par He had blood draws today with Dr. Lantigua. He is hypotensive in the office with no dizziness or light headedness. Adequately hydrating and no skipping meals.\par \par \par \par

## 2023-04-12 NOTE — ASSESSMENT
[FreeTextEntry1] : CAD: CTCA with non-obstructive CAD (2022)\par - Discussed the importance of seeking immediate medical attention if anginal type \par chest pain occurs\par \par Hyperlipidemia: \par - Continue Crestor 5 mg daily\par - Continue Trilipix 135mg daily\par - Discussed therapeutic lifestyle changes to promote improved lipid metabolism (low\par fat, low cholesterol heart healthy diet, striving for optimal weight control, and aerobic exercises\par as tolerated)\par \par Aortic Aneurysm: \par - Obtain echo for size and follow echo q6 months\par - Follows with Dr. Joyce\par - Discussed avoidance of heavy weightlifting exercise\par - Continue Toprol 25mg daily\par Factor V Leiden mutation: Hx of multiple DVT: stable\par - Continue on Lovenox 150mg daily\par - Follow up with Dr. Lantigua\par \par GERD: stable\par - Continue omeprazole 40mg daily\par \par

## 2023-04-12 NOTE — PHYSICAL EXAM
[Normal] : affect appropriate [de-identified] : varicosities in the lower extremities [de-identified] : no ecchymoses or petechiae on extremities

## 2023-04-12 NOTE — REVIEW OF SYSTEMS
[Dyspnea on exertion] : dyspnea during exertion [Negative] : Heme/Lymph [Feeling Fatigued] : feeling fatigued [SOB] : shortness of breath [Chest Discomfort] : chest discomfort

## 2023-04-12 NOTE — HISTORY OF PRESENT ILLNESS
[de-identified] : Patient is a 72 year old male with a history of aortic aneurysm, GERD, hyperlipidemia, prostate cancer in 2005, s/p surgical prostatectomy, Factor V Leiden mutation, and recurrent (3) DVTs of the left lower extremity, elevated homocysteine level, B12 deficiency who now presents for hematologic follow-up. Due to patient's history of recurrent DVTs, (one of which occurred while on Xarelto) he remains on Lovenox, which he tolerates well. Patient has been resistant to Coumadin in the past. He also does not tolerate Eliquis (previously caused a pruritic rash). He follows with , vascular surgeon. At the last visit in November 2022, CBC, B12, folate were normal. APTT was elevated at 41.2, likely due to to his positive lupus and anticoagulant and/or Lovenox. CMP was significant for a creatinine of 1.59.  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  Fairly recent bone density exam was essentially normal.  The patient complains of fatigue, constipation, numbness and tingling down his left  lower extremity peripherally. He is presently undergoing physical therapy for problems with his low back it was suggested that he take gabapentin for his neuropathy. He has lost 10 pounds unintentionally since the last visit and states that he limits the amount of red meat to avoid constipation. He will visit his cardiologist today for complaints of shortness of breath with exertion. Denies chest pain, excessive bruising, any bleeding, recent infection, fever, chills, or sweats.

## 2023-04-12 NOTE — ED ADULT NURSE NOTE - OBJECTIVE STATEMENT
pt presents to ER after being sent by his MD to come to ER. pt went to his dr office to have blood work done, and as per patient, his blood pressure was low so his dr told him to come to ER. BP within normal limits upon arrival to ER. pt c/o generalized fatigue but denies any other complaints.

## 2023-04-12 NOTE — ED PROVIDER NOTE - CLINICAL SUMMARY MEDICAL DECISION MAKING FREE TEXT BOX
72 M pmh aortic aneurysm, nonobstructive CAD (CCTA 2022), GERD, hyperlipidemia, prostate cancer in 2005, s/p surgical prostatectomy, Factor V Leiden mutation, positive lupus anticoagulant, hypercoagulable state recurrent DVT LLE sent from Dr. Salter office for poor PO intake and generalized weakness; found to be orthostatic in cards office- sent for IV hydration.  borderline BP, afebrile, comfortable appearing, appears dehydrated, lungs ctab, hrrr, abd soft/nt, neuro intact w/ steady gait.  will obtain orthostatics again although + in cards office, labs, ekg and give ivf.  does not wish to speak w/ psych, NO SI/HI so ok to arrange outpt appt

## 2023-04-12 NOTE — ED PROVIDER NOTE - OBJECTIVE STATEMENT
72 M pmh aortic aneurysm, nonobstructive CAD (CCTA 2022), GERD, hyperlipidemia, prostate cancer in 2005, s/p surgical prostatectomy, Factor V Leiden mutation, positive lupus anticoagulant, hypercoagulable state recurrent DVT LLE sent from Dr. Salter office for poor PO intake and generalized weakness; found to be orthostatic in cards office- sent for IV hydration.  pt reports he has not been eating/drinking well due to emotional stress.  had a recent loss in family and feeling depressed but denies SI/HI.  states he is feeling more tired than normal.  denies f/c, HA, dizziness, fainting, chest pain, sob, abd pain, nvd, numbness/weakness, dysarthria, dysphagia, fall/trauma

## 2023-04-12 NOTE — ASSESSMENT
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  In the past, patient has been resistant to Coumadin, developed allergic pruritic rash on Eliquis and sustained a deep vein thrombosis while on Xarelto.  He has chronically been on Lovenox injections since.  A fairly recent bone density was essentially normal.  Have ordered Activated Partial Thromboplastin Time, B12 and folate, CBC with differential, CMP, ferritin, homocysteine, iron panel, manual differential, and PT/INR. Venipuncture was performed in the office today. He was advised to call the office to discuss results and next appointment date.

## 2023-04-12 NOTE — ED ADULT TRIAGE NOTE - CHIEF COMPLAINT QUOTE
Pt seen at MD Salter's office for blood work, told to come to ED for IVF for hypotension. Pt reports systolic BP in the 80's at PCP visit.  Pt c/o fatigue. Pt denies any CP, dizziness, SOB.

## 2023-04-12 NOTE — ED PROVIDER NOTE - PATIENT PORTAL LINK FT
You can access the FollowMyHealth Patient Portal offered by [facility] by registering at the following website: [email or web address]. By joining [facility] FollowMyHealth portal, you will also be able to view your health information using other applications (apps) compatible with our system.

## 2023-04-13 ENCOUNTER — LABORATORY RESULT (OUTPATIENT)
Age: 73
End: 2023-04-13

## 2023-04-13 LAB
ALBUMIN SERPL ELPH-MCNC: 4.4 G/DL
ALP BLD-CCNC: 61 U/L
ALT SERPL-CCNC: 11 U/L
ANION GAP SERPL CALC-SCNC: 13 MMOL/L
APTT BLD: 41.3 SEC
AST SERPL-CCNC: 19 U/L
BASOPHILS # BLD AUTO: 0.15 K/UL
BASOPHILS # BLD AUTO: 0.15 K/UL
BASOPHILS NFR BLD AUTO: 3.5 %
BASOPHILS NFR BLD AUTO: 3.5 %
BILIRUB SERPL-MCNC: 0.6 MG/DL
BUN SERPL-MCNC: 21 MG/DL
CALCIUM SERPL-MCNC: 9.6 MG/DL
CHLORIDE SERPL-SCNC: 104 MMOL/L
CO2 SERPL-SCNC: 24 MMOL/L
CREAT SERPL-MCNC: 1.52 MG/DL
EGFR: 48 ML/MIN/1.73M2
EOSINOPHIL # BLD AUTO: 0 K/UL
EOSINOPHIL # BLD AUTO: 0 K/UL
EOSINOPHIL NFR BLD AUTO: 0 %
EOSINOPHIL NFR BLD AUTO: 0 %
FERRITIN SERPL-MCNC: 294 NG/ML
FOLATE SERPL-MCNC: >20 NG/ML
GLUCOSE SERPL-MCNC: 104 MG/DL
HAPTOGLOB SERPL-MCNC: 123 MG/DL
HCT VFR BLD CALC: 40.6 %
HCYS SERPL-MCNC: 13.9 UMOL/L
HGB BLD-MCNC: 12.9 G/DL
INR PPP: 1.17
IRON SATN MFR SERPL: 17 %
IRON SERPL-MCNC: 74 UG/DL
LDH SERPL-CCNC: 146 U/L
LYMPHOCYTES # BLD AUTO: 0.54 K/UL
LYMPHOCYTES # BLD AUTO: 0.54 K/UL
LYMPHOCYTES NFR BLD AUTO: 12.2 %
LYMPHOCYTES NFR BLD AUTO: 12.2 %
MAN DIFF?: NORMAL
MCHC RBC-ENTMCNC: 31.2 PG
MCHC RBC-ENTMCNC: 31.8 GM/DL
MCV RBC AUTO: 98.1 FL
MONOCYTES # BLD AUTO: 0.11 K/UL
MONOCYTES # BLD AUTO: 0.11 K/UL
MONOCYTES NFR BLD AUTO: 2.6 %
MONOCYTES NFR BLD AUTO: 2.6 %
MSMEAR: NORMAL
NEUTROPHILS # BLD AUTO: 3.6 K/UL
NEUTROPHILS # BLD AUTO: 3.6 K/UL
NEUTROPHILS NFR BLD AUTO: 81.7 %
NEUTROPHILS NFR BLD AUTO: 81.7 %
PLAT MORPH BLD: ABNORMAL
PLATELET # BLD AUTO: 167 K/UL
POIKILOCYTOSIS BLD QL SMEAR: SLIGHT
POTASSIUM SERPL-SCNC: 4.3 MMOL/L
PROT SERPL-MCNC: 6.7 G/DL
PT BLD: 13.9 SEC
RBC # BLD: 4.14 M/UL
RBC # FLD: 14.2 %
RBC BLD AUTO: ABNORMAL
SODIUM SERPL-SCNC: 141 MMOL/L
SPHEROCYTES BLD QL SMEAR: SLIGHT
TIBC SERPL-MCNC: 424 UG/DL
UIBC SERPL-MCNC: 351 UG/DL
VIT B12 SERPL-MCNC: 943 PG/ML
WBC # FLD AUTO: 4.41 K/UL

## 2023-04-14 ENCOUNTER — NON-APPOINTMENT (OUTPATIENT)
Age: 73
End: 2023-04-14

## 2023-04-16 LAB
ALBUMIN MFR SERPL ELPH: 61.9 %
ALBUMIN SERPL-MCNC: 4 G/DL
ALBUMIN/GLOB SERPL: 1.6 RATIO
ALPHA1 GLOB MFR SERPL ELPH: 4.5 %
ALPHA1 GLOB SERPL ELPH-MCNC: 0.3 G/DL
ALPHA2 GLOB MFR SERPL ELPH: 7.8 %
ALPHA2 GLOB SERPL ELPH-MCNC: 0.5 G/DL
B-GLOBULIN MFR SERPL ELPH: 13.5 %
B-GLOBULIN SERPL ELPH-MCNC: 0.9 G/DL
GAMMA GLOB FLD ELPH-MCNC: 0.8 G/DL
GAMMA GLOB MFR SERPL ELPH: 12.3 %
INTERPRETATION SERPL IEP-IMP: NORMAL
PROT SERPL-MCNC: 6.5 G/DL
PROT SERPL-MCNC: 6.5 G/DL

## 2023-05-03 NOTE — REVIEW OF SYSTEMS
[Feeling Fatigued] : feeling fatigued [SOB] : shortness of breath [Dyspnea on exertion] : dyspnea during exertion [Chest Discomfort] : chest discomfort [Negative] : Heme/Lymph

## 2023-05-04 ENCOUNTER — APPOINTMENT (OUTPATIENT)
Dept: HEART AND VASCULAR | Facility: CLINIC | Age: 73
End: 2023-05-04
Payer: MEDICARE

## 2023-05-04 VITALS
RESPIRATION RATE: 18 BRPM | WEIGHT: 192 LBS | OXYGEN SATURATION: 98 % | TEMPERATURE: 98.5 F | BODY MASS INDEX: 29.1 KG/M2 | SYSTOLIC BLOOD PRESSURE: 118 MMHG | HEIGHT: 68 IN | HEART RATE: 71 BPM | DIASTOLIC BLOOD PRESSURE: 76 MMHG

## 2023-05-04 PROCEDURE — 99214 OFFICE O/P EST MOD 30 MIN: CPT

## 2023-05-04 NOTE — ASSESSMENT
[FreeTextEntry1] : Anxiety: Pt expressed he feels overwhelmed. He lost his son 3 yrs ago and unable to cope.\par - Denies SI  and HI.  Pt is on Celexa and Ativan started by his PMD.\par - He will call his insurance to find a psych provider. \par - He will go to [place] [facility] to seek counseling as well.\par - Discussed stress reduction, meditation and gave him stress & depression counseling.\par \par CAD: CTCA with non-obstructive CAD (2022)\par - Discussed the importance of seeking immediate medical attention if anginal type \par chest pain occurs\par \par Hyperlipidemia: \par - Continue Crestor 5 mg daily\par - Continue Trilipix 135mg daily\par - Discussed therapeutic lifestyle changes to promote improved lipid metabolism (low\par fat, low cholesterol heart healthy diet, striving for optimal weight control, and aerobic exercises\par as tolerated)\par \par Aortic Aneurysm: \par - Obtain echo for size and follow echo q6 months\par - Follows with Dr. Joyce\par - Discussed avoidance of heavy weightlifting exercise\par - Restart Toprol at 12.5mg daily and increase as tolerated.\par \par Factor V Leiden mutation: Hx of multiple DVT: stable\par - Continue on Lovenox 150mg daily\par - Follow up with Dr. Lantigua\par \par GERD: stable\par - Continue omeprazole 40mg daily\par \par

## 2023-05-04 NOTE — HISTORY OF PRESENT ILLNESS
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 Resistant to Coumadin in the past, does not tolerate Eliquis and on lovenox. He follows with vascular . Last visit he had complaints of fatigue and hypotensive in the office. He was sent to the ER for evaluation. He was given 2L NS and was discharged. Presently denies CP, SOB/LANDRY LE edema, dizziness, palpitations, syncope or near syncope. He walks numerous miles daily and LANDRY up to 5 flights of stairs.  Adequately hydrating and no skipping meals.\par \par \par \par \par \par \par

## 2023-06-07 ENCOUNTER — APPOINTMENT (OUTPATIENT)
Dept: HEART AND VASCULAR | Facility: CLINIC | Age: 73
End: 2023-06-07
Payer: MEDICARE

## 2023-06-07 VITALS
DIASTOLIC BLOOD PRESSURE: 67 MMHG | SYSTOLIC BLOOD PRESSURE: 107 MMHG | BODY MASS INDEX: 27.58 KG/M2 | WEIGHT: 182 LBS | HEIGHT: 68 IN | OXYGEN SATURATION: 98 % | HEART RATE: 60 BPM

## 2023-06-07 DIAGNOSIS — U09.9 POST COVID-19 CONDITION, UNSPECIFIED: ICD-10-CM

## 2023-06-07 PROCEDURE — 99214 OFFICE O/P EST MOD 30 MIN: CPT

## 2023-06-07 RX ORDER — CITALOPRAM 10 MG/1
10 TABLET, FILM COATED ORAL DAILY
Refills: 0 | Status: DISCONTINUED | COMMUNITY
Start: 2023-05-04 | End: 2023-06-07

## 2023-06-07 RX ORDER — ROSUVASTATIN CALCIUM 5 MG/1
5 TABLET, FILM COATED ORAL
Refills: 0 | Status: DISCONTINUED | COMMUNITY
End: 2023-06-07

## 2023-06-07 RX ORDER — ALPRAZOLAM 2 MG/1
2 TABLET ORAL
Qty: 60 | Refills: 0 | Status: DISCONTINUED | COMMUNITY
Start: 2022-07-01 | End: 2023-06-07

## 2023-06-07 RX ORDER — LINACLOTIDE 72 UG/1
72 CAPSULE, GELATIN COATED ORAL DAILY
Refills: 0 | Status: DISCONTINUED | COMMUNITY
Start: 2023-04-12 | End: 2023-06-07

## 2023-06-07 RX ORDER — LORAZEPAM 1 MG/1
1 TABLET ORAL
Refills: 0 | Status: DISCONTINUED | COMMUNITY
Start: 2023-05-04 | End: 2023-06-07

## 2023-06-07 RX ORDER — SERTRALINE HYDROCHLORIDE 50 MG/1
50 TABLET, FILM COATED ORAL DAILY
Refills: 0 | Status: ACTIVE | COMMUNITY

## 2023-06-07 RX ORDER — FENOFIBRIC ACID 135 MG/1
135 CAPSULE, DELAYED RELEASE ORAL DAILY
Refills: 0 | Status: DISCONTINUED | COMMUNITY
Start: 2022-07-27 | End: 2023-06-07

## 2023-06-12 NOTE — ASSESSMENT
[FreeTextEntry1] : SOB/LANDRY: likely post-Covid symptoma vs. anxiety. CTCA with non-obstructive CAD (2022)\par - Obtain TTE to evaluate EF and structural heart.\par - Discussed the importance of seeking immediate medical attention if anginal type \par chest pain occurs\par \par Anxiety/ Depression: Pt expressed he feels overwhelmed. He lost his son 3 yrs ago and coping better now. Recent psych admission at [facility].\par - Denies SI  and HI.  \par - Follows up with Dr. Elam at [facility] psychiatry\par - Continue current psych medications as ordered.\par \par Hyperlipidemia: \par - Continue Crestor 5 mg daily\par - Continue Trilipix 135mg daily\par - Discussed therapeutic lifestyle changes to promote improved lipid metabolism (low\par fat, low cholesterol heart healthy diet, striving for optimal weight control, and aerobic exercises\par as tolerated)\par \par Aortic Aneurysm: \par - Obtain echo for size and follow echo q6 months\par - Follows with Dr. Joyce\par - Discussed avoidance of heavy weightlifting exercise\par - Continue Toprol at 12.5mg daily and increase next visit if HR permits.\par \par Factor V Leiden mutation: Hx of multiple DVT: stable\par - Continue on Lovenox 150mg daily\par - Follow up with Dr. Lantigua\par \par GERD: stable\par - Continue omeprazole 40mg daily\par \par

## 2023-06-12 NOTE — HISTORY OF PRESENT ILLNESS
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 Resistant to Coumadin in the past, does not tolerate Eliquis and on lovenox. He follows with vascular . Last visit he had expressed feeling of anxiety and depressed mood. Encouraged him to see psychiatric intervention or go to the ED if necessary. He was also given antianxiety medications by his PMD. Approximately 2 weeks later he went to [facility] ER for a psych evaluation and was admitted (incidental finding of Covid + at that time). Presently denies CP, SOB/LANDRY LE edema, dizziness, palpitations, syncope or near syncope. He walks numerous miles daily and LANDRY up to 5 flights of stairs.  Adequately hydrating and no skipping meals. His mood is much better and follows up with psych for therapy. He is here for follow up and mild SOB. \par \par \par \par \par

## 2023-06-21 ENCOUNTER — APPOINTMENT (OUTPATIENT)
Dept: CARDIOTHORACIC SURGERY | Facility: CLINIC | Age: 73
End: 2023-06-21
Payer: MEDICARE

## 2023-06-21 VITALS
OXYGEN SATURATION: 95 % | TEMPERATURE: 97.7 F | HEIGHT: 74 IN | BODY MASS INDEX: 23.61 KG/M2 | SYSTOLIC BLOOD PRESSURE: 102 MMHG | DIASTOLIC BLOOD PRESSURE: 59 MMHG | HEART RATE: 54 BPM | RESPIRATION RATE: 18 BRPM | WEIGHT: 184 LBS

## 2023-06-21 PROCEDURE — 99215 OFFICE O/P EST HI 40 MIN: CPT

## 2023-06-22 RX ORDER — CHLORHEXIDINE GLUCONATE 4 %
400 LIQUID (ML) TOPICAL
Refills: 0 | Status: COMPLETED | COMMUNITY
End: 2023-06-22

## 2023-06-22 RX ORDER — OMEPRAZOLE 40 MG/1
40 CAPSULE, DELAYED RELEASE ORAL
Qty: 90 | Refills: 3 | Status: COMPLETED | COMMUNITY
Start: 2019-04-17 | End: 2023-06-22

## 2023-06-22 NOTE — PHYSICAL EXAM
[Sclera] : the sclera and conjunctiva were normal [PERRL With Normal Accommodation] : pupils were equal in size, round, and reactive to light [Extraocular Movements] : extraocular movements were intact [Neck Appearance] : the appearance of the neck was normal [Neck Cervical Mass (___cm)] : no neck mass was observed [Respiration, Rhythm And Depth] : normal respiratory rhythm and effort [Exaggerated Use Of Accessory Muscles For Inspiration] : no accessory muscle use [Apical Impulse] : the apical impulse was normal [Heart Rate And Rhythm] : heart rate was normal and rhythm regular [Heart Sounds] : normal S1 and S2 [Examination Of The Chest] : the chest was normal in appearance [2+] : left 2+ [No Abnormalities] : the abdominal aorta was not enlarged and no bruit was heard [Bowel Sounds] : normal bowel sounds [Abdomen Soft] : soft [Abdomen Tenderness] : non-tender [No CVA Tenderness] : no ~M costovertebral angle tenderness [Abnormal Walk] : normal gait [Nail Clubbing] : no clubbing  or cyanosis of the fingernails [Musculoskeletal - Swelling] : no joint swelling seen [Skin Color & Pigmentation] : normal skin color and pigmentation [Skin Turgor] : normal skin turgor [] : no rash [Cranial Nerves] : cranial nerves 2-12 were intact [Sensation] : the sensory exam was normal to light touch and pinprick [Deep Tendon Reflexes (DTR)] : deep tendon reflexes were 2+ and symmetric [Oriented To Time, Place, And Person] : oriented to person, place, and time [Impaired Insight] : insight and judgment were intact [Affect] : the affect was normal

## 2023-06-25 NOTE — DATA REVIEWED
[FreeTextEntry1] : CT Chest with IV contrast 7/12/23: Heart size mildly enlarged. 5.2 cm mid ascending thoracic aortic aneurysm Motion limited assessment at the level of the aortic root which does not appear substantially aneurysmal. Mild transverse aortic arch measures 4.1 cm. 1.1 x 0.5 cm focal outpouching at the right lateral aspect of the lower descending thoracic aorta. 2.2 cm low attenuation left adrenal nodule is presumably an adenoma. \par \par CTA coronary 8/19/22\par trileaflet aortic valve. aortic root 4.5cm. ascending aorta 5cm. proximal arch 4.5cm. isthmus 4cm. descending aorta 3.8cm. incomplete coaptation suggestive for aortic regurgitation. \par mild coronary calcium with a score of 62. \par dilated central pulmonary arteries 3.4cm. \par \par TTE 9/2/22 normal EF. Trace AI. \par Dilated aortic root, 4.3 cm. proximal ascending aorta, 4.9 cm. \par \par ECG:\par 7/27/2022: Sinus bradycardia, 59 bpm, left axis, LAFB, anterior infarct (age undetermined)\par ----------------------------------\par Echo:\par 0/04/2015: EF 55-60%. Moderate aortic root dilation (4.9 cm). Ascending aorta is dilated (4.5 cm) and aortic arch is dilated (4.6cm). Nml LVSF. Mild MR\par -----------------------------------\par Holter:\par 2/11/2019: Rare PVCs. No arrhythmias\par \par CT chest without contrast 9/9/2016\par ascending aorta 4.9cm\par left adrenal adenoma \par

## 2023-06-25 NOTE — PROCEDURE
[FreeTextEntry1] : \par Patient was advised to view the educational video prior to this visit regarding aortic pathology, risk factors, surgical procedures, and lifestyle modifications. Video can be retrieved at <[email or web address].\par

## 2023-06-25 NOTE — HISTORY OF PRESENT ILLNESS
[FreeTextEntry1] : 72 year old male with hx of Factor V Leiden mutation, DVT of the left lower extremity (X3 while on Xarelto; Resistant to Coumadin in the past and does not tolerate Eliquis; currently on Lovenox injections), prostate cancer in 2005 s/p surgical prostatectomy, GERD, renal insufficiency, HLD, covid who presents for a 1 year follow up for evaluation and management of thoracic aortic aneurysm. \par \par Patient is doing well and denies recent hospitalization, ER visits, or surgeries. He denies fever, chills, fatigue, headache, blurred vision, dizziness, syncope, chest pain, palpitations,, orthopnea, paroxysmal nocturnal dyspnea, nausea, vomiting, abdominal pain, back pain, BRBPR or swelling to legs.\par  \par \par hematologist: Galilea Lantigua MD \par

## 2023-06-25 NOTE — END OF VISIT
[Time Spent: ___ minutes] : I have spent [unfilled] minutes of time on the encounter. [FreeTextEntry3] : I, PHONG GARCIA , am scribing for and in the presence of NAM LEE the following sections: History of present illness, past Medical/family/surgical/family/social history, review of systems, vital signs, physical exam and disposition.\par  \par I personally performed the services described in the documentation, reviewed the documentation recorded by the scribe in my presence and it accurately and completely records my words and actions.\par

## 2023-06-25 NOTE — ASSESSMENT
[FreeTextEntry1] : 72 year old male with hx of Factor V Leiden mutation, DVT of the left lower extremity (X3 while on Xarelto; Resistant to Coumadin in the past and does not tolerate Eliquis; currently on Lovenox injections), prostate cancer in 2005 s/p surgical prostatectomy, GERD, renal insufficiency, HLD, covid who presents for a 1 year follow up for evaluation and management of thoracic aortic aneurysm. \par \par  \par I have reviewed the patient's medical records, diagnostic images during the time of this office consultation and have made the following recommendation. I have reviewed the indications for surgery, and used our webpage [email or web address] <[email or web address]> to illustrate the aorta and anatomy of the heart. I have discussed the indications for surgery with the patient. Those indications are the following: size greater than 5.0 cm, symptomatic aneurysms, family history of aortic dissection or aneurysm death with a size greater than 4.5 cm, other necessary cardiac procedures such as coronary artery bypass grafting or valvular disorders with an aneurysm greater than 4.5 cm, or connective tissue disorders with an aneurysm size greater than 4.5 cm. The patient meets the indications.\par \par I had a lengthy discussion with the patient regarding his aneurysmal and valvular disease and progression. I have recommended that the patient is a candidate for an ASCENDING AORTA REPLACEMENT  I have discussed the risks, benefits and alternatives to surgery. I have explained the risks of the surgery, including approximately 1% major mortality or morbidity including stroke, infection, bleeding, death, renal failure and heart attack. There is a 20% risk of developing atrial fibrillation in the postoperative period and a 3% chance of requiring a permanent pacemaker.All questions were addressed and patient agrees to proceed with surgery. I have answered all questions fully.\par  \par \par Plan: \par Admit for LHC, PST (chest xray, EKG, chest xray, LABS, UA), and heparin drip \par Hematology clearance and recommendations \par Follow up on echo results (pt is scheduled for July 19th)\par Dental Clearance (scheduled for July 20) \par Patient would like to schedule sx in August  Speaking Coherently

## 2023-06-28 ENCOUNTER — APPOINTMENT (OUTPATIENT)
Dept: HEART AND VASCULAR | Facility: CLINIC | Age: 73
End: 2023-06-28
Payer: MEDICARE

## 2023-06-28 PROCEDURE — 99443: CPT

## 2023-06-28 NOTE — HISTORY OF PRESENT ILLNESS
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 Resistant to Coumadin in the past, does not tolerate Eliquis and on lovenox. Presently denies CP, SOB/LANDRY LE edema, dizziness, palpitations, syncope or near syncope. He walks numerous miles daily and SOB/LANDRY from last visit resolved.  Adequately hydrating and no skipping meals. His mood is much better and follows up with psych for therapy.His most recent CT chest revealed an increase in size of his aortic aneurysm. He is here for follow up and to discuss his upcoming surgery.\par \par Discussed risk and benefits of the procedure. Pt states he is seeing Dr. Johns from [facility] for a second opinion. \par \par \par \par \par \par \par

## 2023-06-28 NOTE — REASON FOR VISIT
[Symptom and Test Evaluation] : symptom and test evaluation [Hyperlipidemia] : hyperlipidemia [Home] : at home, [unfilled] , at the time of the visit. [Medical Office: ([facility])___] : at the medical office located in  [Verbal consent obtained from patient] : the patient, [unfilled] [FreeTextEntry1] : Diagnostic Test:\par ----------------------------------\par ECG:\par 02/15/2023:  Sinus bradycardia, 56 bpm, left axis, LAFB\par 07/27/2022: Sinus bradycardia, 59 bpm, left axis, LAFB, anterior infarct (age undetermined)\par ----------------------------------\par Echo:\par 0/04/2015: EF 55-60%. Moderate aortic root dilation (4.9 cm) . Ascending aorta is dilated (4.5 cm) and aortic arch is dialted (4.6cm). Nml LVSF. Mild MR\par -----------------------------------\par CCTA:\par 07/19/2022: Ca+ 62.  LM nml. P/MLAD ~30%, pLCx focal calcified plaque ~50%, pRCA 50-60% and mRCA 40-50% stenosis. \par \par Aneurysmal aortic root and ascending aorta, extending to the proximal aortic arch.\par Sinus of Valsalva  4.5 cm\par Ascending Aorta   5.0 cm\par Prox Aortic arch    4.5 cm\par -----------------------------------\par CT Chest with IV contrast 7/12/23: Heart size mildly enlarged. 5.2 cm mid ascending thoracic aortic aneurysm Motion limited assessment at the level of the aortic root which does not appear substantially aneurysmal. Mild transverse aortic arch measures 4.1 cm. 1.1 x 0.5 cm focal outpouching at the right lateral aspect of the lower descending thoracic aorta. 2.2 cm low attenuation left adrenal nodule is presumably an adenoma. \par -----------------------------------\par Holter:\par 2/11/2019: Rare PVCs. No arrhythmias\par

## 2023-06-28 NOTE — ASSESSMENT
[FreeTextEntry1] : SOB/LANDRY: likely post-Covid symptoms vs. anxiety. CTCA with non-obstructive CAD (2022)\par - Obtain TTE to evaluate EF and structural heart.\par - Discussed the importance of seeking immediate medical attention if anginal type \par chest pain occurs\par \par Aortic Aneurysm: 5.2 cm mid ascending thoracic aortic aneurysm.\par -  Ascending Aorta Replacement Sx recommended.\par - Follows with Dr. Joyce\par - Discussed avoidance of heavy weightlifting exercise\par - Continue Toprol at 12.5mg daily and increase next visit if HR permits.\par \par Anxiety/ Depression: Pt expressed he feels overwhelmed. He lost his son 3 yrs ago and coping better now. Recent psych admission at [facility].\par - Denies SI  and HI.  \par - Follows up with Dr. Elam at [facility] psychiatry\par - Continue current psych medications as ordered.\par \par Hyperlipidemia: \par - Continue Crestor 5 mg daily\par - Continue Trilipix 135mg daily\par - Discussed therapeutic lifestyle changes to promote improved lipid metabolism (low\par fat, low cholesterol heart healthy diet, striving for optimal weight control, and aerobic exercises\par as tolerated)\par \par Factor V Leiden mutation: Hx of multiple DVT: stable\par - Continue on Lovenox 150mg daily\par - Follow up with Dr. Lantigua\par \par GERD: stable\par - Continue omeprazole 40mg daily\par \par

## 2023-07-19 ENCOUNTER — OUTPATIENT (OUTPATIENT)
Dept: OUTPATIENT SERVICES | Facility: HOSPITAL | Age: 73
LOS: 1 days | End: 2023-07-19
Payer: MEDICARE

## 2023-07-19 ENCOUNTER — APPOINTMENT (OUTPATIENT)
Dept: OTOLARYNGOLOGY | Facility: CLINIC | Age: 73
End: 2023-07-19
Payer: MEDICARE

## 2023-07-19 VITALS — WEIGHT: 184 LBS | BODY MASS INDEX: 22.88 KG/M2 | HEIGHT: 75 IN

## 2023-07-19 DIAGNOSIS — J34.2 DEVIATED NASAL SEPTUM: ICD-10-CM

## 2023-07-19 DIAGNOSIS — I71.9 AORTIC ANEURYSM OF UNSPECIFIED SITE, WITHOUT RUPTURE: ICD-10-CM

## 2023-07-19 DIAGNOSIS — R06.09 OTHER FORMS OF DYSPNEA: ICD-10-CM

## 2023-07-19 PROCEDURE — 93306 TTE W/DOPPLER COMPLETE: CPT

## 2023-07-19 PROCEDURE — 99213 OFFICE O/P EST LOW 20 MIN: CPT | Mod: 25

## 2023-07-19 PROCEDURE — 93306 TTE W/DOPPLER COMPLETE: CPT | Mod: 26

## 2023-07-19 PROCEDURE — 31231 NASAL ENDOSCOPY DX: CPT

## 2023-07-19 NOTE — HISTORY OF PRESENT ILLNESS
[de-identified] : YUE HENSON is a 72 year old patient with multiple medical problems here for evaluation for sinusitis.  For the past 4 to 5 weeks, he has had headaches involving the right frontal area which occasionally cross to the left side.  He has no nasal congestion, nasal obstruction, nasal drainage.  He had COVID in May and thinks it may have followed that.  He has been on Astelin nasal spray and doxycycline.  He has phlegm in the throat.  He has not felt better with the antibiotics.  He had a CT scan of the sinuses.  I was able to review the report and the films.  There was no significant sinus inflammation.  There were bilateral middle turbinate pieter bullosa, deviated septum, mild inferior maxillary sinus mucosal thickening and narrow but patent OMCs.  There was an area suspicious for Paget's disease involving the right temporal occipital calvarium.\par \par He does not have a history of allergies or recurrent sinusitis\par He has a history of bilateral tinnitus and sensorineural hearing loss.  He has not had a recent audiogram and does not use hearing aids.  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

## 2023-07-19 NOTE — ASSESSMENT
[FreeTextEntry1] : He has a 4 to 5-week history of right-sided headaches.  He has been on antibiotics and Astelin.  Nasal endoscopy showed a deviated septum but was otherwise unremarkable.  CT scan did not show significant sinus inflammation.  The headaches are unlikely to be due to his sinuses.\par \par He has phlegm in the throat.  This may be due to reflux.  He has a history of it.\par \par He has bilateral tinnitus and sensorineural hearing loss.\par \par Plan\par -Findings and management options were discussed with the patient.\par -He will finish the antibiotics and use Astelin as needed\par -I recommended neurology evaluation for the headaches\par -I also recommended dental evaluation to rule out dental source\par -He said that he has a history of Paget's disease.  He may speak with his PCP about the CT scan findings\par -I have asked him to consider repeat audiogram.  He may benefit from hearing aids if the hearing loss has progressed since his last test\par -I have asked him to follow-up to repeat his audiogram.  He said he has a surgery scheduled in the near future.  He may follow-up after that.\par -He should call and return earlier if he has any concerns or worsening symptoms

## 2023-07-25 ENCOUNTER — APPOINTMENT (OUTPATIENT)
Dept: NEUROLOGY | Facility: CLINIC | Age: 73
End: 2023-07-25
Payer: MEDICARE

## 2023-07-25 VITALS
SYSTOLIC BLOOD PRESSURE: 109 MMHG | DIASTOLIC BLOOD PRESSURE: 67 MMHG | HEART RATE: 48 BPM | BODY MASS INDEX: 18.03 KG/M2 | HEIGHT: 75 IN | WEIGHT: 145 LBS

## 2023-07-25 DIAGNOSIS — R51.9 HEADACHE, UNSPECIFIED: ICD-10-CM

## 2023-07-25 DIAGNOSIS — R29.898 OTHER SYMPTOMS AND SIGNS INVOLVING THE MUSCULOSKELETAL SYSTEM: ICD-10-CM

## 2023-07-25 PROCEDURE — 99205 OFFICE O/P NEW HI 60 MIN: CPT

## 2023-07-25 PROCEDURE — 99215 OFFICE O/P EST HI 40 MIN: CPT

## 2023-07-25 NOTE — HISTORY OF PRESENT ILLNESS
[FreeTextEntry1] : This is a case of a 72 yr right handed male with PMH of  anxiety ,aortic aneurysm, nonobstructive CAD (CCTA 2022), GERD, hyperlipidemia, prostate cancer in 2005, s/p surgical prostatectomy, Factor V Leiden mutation, positive lupus anticoagulant, hypercoagulable state recurrent DVT LLE, tinnitus, headaches, presents today with headaches. \par \par Pt stated he has not had a history of headache.  Pt stated these headaches started 5-6 weeks ago.  Located frontal, and right front.  Pt states he had a headache every day.  Pt wakes up in the am and then notices the headache develops.  Headaches will last for 3-4 hrs.  Described as sinus "pressure".  Denies n/v.  Denies sensitivity to light, sound, smell.  Denies dizziness.  Denies any numbness or tingling in extremities or slurred speech.  Denies loss of vision, blurriness, or doubled. Denies auras. Denies fevers, muscles aches, denies jaw pain.  Denies sensitivity.   Treating with Tylenol with relief at times, other times does not work as well.  pt is  treating with Tylenol 4/5 times a week, sometimes twice a day. Pt will just let the Pt does have neck tension.  Pt reports having covid May 15th with sinus related symptoms. Pt also had SOB after covid then shortly after headaches started. Triggers: covid? \par \par Going for Aortic replacement and valve repair in aug.  Dr Bey ([place]) \par \par Past medication:\par Current medication: zoloft 50 mg, \par Occupation: retired/ musician\par Caffeine: tea in the am \par Water: 8 glasses \par Exercise:  walk 3 miles a days\par sleep: 8 hrs\par \par \par Location:frontal, and right front.\par Description: pressure \par Associated symptoms: n/a \par Triggers: covid?\par Comorbidities: None\par Other pain conditions: \par Imaging:  CT \par Interventions: \par Family history: none \par Allergies: Eliquis, PCN, levaquin\par \par \par Symptoms: headache\par  \par \par

## 2023-07-25 NOTE — PHYSICAL EXAM
[FreeTextEntry1] : Physical Exam\par \par Constitutional: Patient was well-developed, well-nourished and in no acute distress. \par \par Head: Normocephalic, atraumatic. Tympanic membranes were clear. \par \par Neck: Supple with full range of motion. \par \par Cardiovascular: Cardiac rhythm was regular without murmur. There were no carotid bruits. Peripheral pulses were full and symmetric. \par \par Respiratory: Lungs were clear. \par \par Abdomen: Soft and nontender. \par \par Spine: Nontender. \par \par Skin: There were no rashes. \par \par NEUROLOGICAL EXAMINATION:\par \par Mental Status: Patient was alert and oriented. Speech was fluent. There was no dysarthria.  Affect was normal.\par \par Cranial Nerves: \par \par II: Visual acuity was 20/25 bilaterally with near card. Pupils were equal and reactive. Visual fields were full. \par \par III, IV, VI: Eye movements were full without nystagmus. \par \par V: Facial sensation was intact. \par \par VII: Facial strength was normal. \par \par VIII: Hearing was equal. \par \par IX, X: Palatal movement was normal. Phonation was normal. \par \par XI: Sternocleidomastoids and trapezii some tension on right neck. \par \par XII: Tongue was midline and movements normal. There was no lingual atrophy or fasciculations. \par \par Motor Examination: Muscle bulk, tone and strength were normal. \par \par Sensory Examination:  vibration and joint position sense were intact. \par \par Reflexes: R L\par  Biceps 2+ 2+\par  Patellar 2+ 2+\par  Achilles 1+ 1+\par \par Coordination/Cerebellar Function: There was no dysmetria on finger to nose \par \par Gait/Stance: Gait and tandem were normal. Romberg was negative.\par \par \par

## 2023-07-25 NOTE — ASSESSMENT
[FreeTextEntry1] : This is a case of a 72 yr right handed male with PMH of  anxiety ,aortic aneurysm, nonobstructive CAD (CCTA 2022), GERD, hyperlipidemia, prostate cancer in 2005, s/p surgical prostatectomy, Factor V Leiden mutation, positive lupus anticoagulant, hypercoagulable state recurrent DVT LLE, tinnitus, headaches, presents today with headaches.\par  \par MRI to r/o structural pathology\par \par Plan: \par    {   } Cut down OTC use slowly with Tylenol \par    {   } MRI brain \par    {   } diclofenac gel 1 % to neck area\par    {   } heat to neck for tension\par    {   } Will consider gabapentin 100 mg once daily \par \par \par Headache education provided:\par [] Stay well hydrated\par [] Limit excessive caffeine and alcohol intake\par [] Maintain good sleep hygiene\par [] Try to avoid triggers\par [] Practice good eating habits\par [] Avoid excessive use of over the counter pain medications, as they can cause medication overuse headaches \par [] Keep a headache diary\par [] Relaxation techniques, biofeedback, massage therapy, acupunctures, and heating pads may be effective\par \par The above plan was discussed with Joss Anne in great detail. Joss Anne  verbalized understanding and agrees with plan as detailed above. Patient was provided education and counselling on current diagnosis/symptoms. He was advised to call our clinic at [phone] for any new or worsening symptoms, or with any questions or concerns. In case of acute onset of neurological symptoms or worsening presentation, patient was advised to present to nearest emergency room for further evaluation. Joss Dayana  expressed understanding and all his questions/concerns were addressed.\par \par

## 2023-07-27 DIAGNOSIS — Z79.01 LONG TERM (CURRENT) USE OF ANTICOAGULANTS: ICD-10-CM

## 2023-08-01 ENCOUNTER — APPOINTMENT (OUTPATIENT)
Dept: HEMATOLOGY ONCOLOGY | Facility: CLINIC | Age: 73
End: 2023-08-01
Payer: MEDICARE

## 2023-08-01 VITALS
WEIGHT: 184 LBS | OXYGEN SATURATION: 98 % | DIASTOLIC BLOOD PRESSURE: 72 MMHG | BODY MASS INDEX: 22.88 KG/M2 | TEMPERATURE: 97.5 F | HEIGHT: 75 IN | SYSTOLIC BLOOD PRESSURE: 138 MMHG | HEART RATE: 53 BPM

## 2023-08-01 DIAGNOSIS — D64.9 ANEMIA, UNSPECIFIED: ICD-10-CM

## 2023-08-01 DIAGNOSIS — Z86.718 PERSONAL HISTORY OF OTHER VENOUS THROMBOSIS AND EMBOLISM: ICD-10-CM

## 2023-08-01 DIAGNOSIS — R76.0 RAISED ANTIBODY TITER: ICD-10-CM

## 2023-08-01 DIAGNOSIS — D68.59 OTHER PRIMARY THROMBOPHILIA: ICD-10-CM

## 2023-08-01 DIAGNOSIS — E53.8 DEFICIENCY OF OTHER SPECIFIED B GROUP VITAMINS: ICD-10-CM

## 2023-08-01 LAB
APTT BLD: 45.1 SEC
INR PPP: 1.08
PT BLD: 12.3 SEC
RBC # BLD: 3.84 M/UL
RETICS # AUTO: 1.3 %
RETICS AGGREG/RBC NFR: 49.9 K/UL

## 2023-08-01 PROCEDURE — 99214 OFFICE O/P EST MOD 30 MIN: CPT | Mod: 25

## 2023-08-01 PROCEDURE — 36415 COLL VENOUS BLD VENIPUNCTURE: CPT

## 2023-08-01 NOTE — REASON FOR VISIT
[Follow-Up Visit] : a follow-up visit for [FreeTextEntry2] : History of recurrent lower extremity DVT, factor V Leiden mutation, lupus anticoagulant positive, hypercoagulable state, anemia, low vitamin B12 level

## 2023-08-01 NOTE — ASSESSMENT
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 In the past, patient has been resistant to Coumadin, developed allergic pruritic rash on Eliquis and sustained a deep vein thrombosis while on Xarelto. He has chronically been on Lovenox injections since. Concur with the preop instructions put forth by the cardiovascular surgical team regarding his anticoagulation.Would consider hematologic consultation and follow-up in the perioperative period.  When to resume anticoagulation in the postop period is at the discretion of the cardiovascular surgical team. Have ordered Activated Partial Thromboplastin Time, B12 and folate, CBC with differential, CMP, Diluted Clint's Viper Venom Time, ferritin, Hexagonal Phase Lupus assay, iron panel, manual differential, MR Head no cont, Prothrombin time and INR plasma, reticulocyte count and Transthoracic Echocardiogram-TTE. Venipuncture was performed in the office today. He was advised to call the office to discuss results and further management.

## 2023-08-01 NOTE — PHYSICAL EXAM
[Normal] : affect appropriate [de-identified] : Diminished hearing R>L [de-identified] : varicosities in the lower extremities [de-identified] : one ecchymosis LUE

## 2023-08-01 NOTE — HISTORY OF PRESENT ILLNESS
[de-identified] : Patient is a 73-year-old male with a history of aortic aneurysm, GERD, hyperlipidemia, prostate cancer in 2005, s/p surgical prostatectomy, Factor V Leiden mutation, recurrent (3) DVTs of the left lower extremity, elevated homocysteine level, B12 deficiency, COVID-19 infection in May 2023 y, who now presents for hematologic follow-up. Due to patient's history of recurrent DVTs, (one of which occurred while on Xarelto) he remains on Lovenox, which he tolerates well. Patient has been resistant to Coumadin in the past. He also does not tolerate Eliquis (previously caused a pruritic rash). He follows with Dr. Yo, vascular surgeon. At the last visit in April, the white blood count was 4.41, hemoglobin was 12.9, hematocrit was 40.6 and platelet count was 167,000. Glucose was 104, Creatinine was 1.52 and APTT was 41.3. PT was 13.9 and INR was 1.17. LDH, Immunofixation, B12 and folate and ferritin were normal. Patient will have ascending aorta replacement for an enlarging Aortic aneurysm at [facility] on August 25 with Dr. Jacques Cobian. The patient was advised by the cardiovascular surgical team to take his last dose of Lovenox on the morning of August 23.  He will be admitted for preoperative preparation on August 24 and surgery will take place on August 25. The patient states that his headaches have improved.  He is presently experiencing worsening tinnitus and hearing loss.  He is under the care of both a neurologist and an otolaryngologist. He is scheduled for an MRI of his head and a hearing test within the next week.  He otherwise feels generally well. Denies chest pain, excessive bruising, any bleeding, fever, chills, or sweats.

## 2023-08-01 NOTE — REVIEW OF SYSTEMS
[Recent Change In Weight] : ~T recent weight change [Loss of Hearing] : loss of hearing [Negative] : Allergic/Immunologic [Fever] : no fever [Chills] : no chills [Night Sweats] : no night sweats [Fatigue] : no fatigue [Vision Problems] : no vision problems [Chest Pain] : no chest pain [Shortness Of Breath] : no shortness of breath [Abdominal Pain] : no abdominal pain [Dysuria] : no dysuria [Joint Pain] : no joint pain [Skin Rash] : no skin rash [Dizziness] : no dizziness [Easy Bleeding] : no tendency for easy bleeding [Easy Bruising] : no tendency for easy bruising [FreeTextEntry2] : Lost 6 pounds [FreeTextEntry4] : Tinnitus Tinnitus  [de-identified] : Headaches, under care of neurologist

## 2023-08-02 LAB
ALBUMIN SERPL ELPH-MCNC: 4.3 G/DL
ALP BLD-CCNC: 69 U/L
ALT SERPL-CCNC: 14 U/L
ANION GAP SERPL CALC-SCNC: 12 MMOL/L
ANISOCYTOSIS BLD QL: SLIGHT
AST SERPL-CCNC: 17 U/L
BASOPHILS # BLD AUTO: 0.04 K/UL
BASOPHILS NFR BLD AUTO: 0.9 %
BILIRUB SERPL-MCNC: 0.4 MG/DL
BUN SERPL-MCNC: 20 MG/DL
CALCIUM SERPL-MCNC: 9 MG/DL
CHLORIDE SERPL-SCNC: 110 MMOL/L
CO2 SERPL-SCNC: 24 MMOL/L
CREAT SERPL-MCNC: 1.19 MG/DL
DACRYOCYTES BLD QL SMEAR: SLIGHT
EGFR: 64 ML/MIN/1.73M2
EOSINOPHIL # BLD AUTO: 0.07 K/UL
EOSINOPHIL NFR BLD AUTO: 1.8 %
FERRITIN SERPL-MCNC: 246 NG/ML
FOLATE SERPL-MCNC: >20 NG/ML
GIANT PLATELETS BLD QL SMEAR: PRESENT
GLUCOSE SERPL-MCNC: 91 MG/DL
IRON SATN MFR SERPL: 30 %
IRON SERPL-MCNC: 84 UG/DL
LYMPHOCYTES # BLD AUTO: 1.02 K/UL
LYMPHOCYTES NFR BLD AUTO: 25.7 %
MONOCYTES # BLD AUTO: 0.15 K/UL
MONOCYTES NFR BLD AUTO: 3.7 %
MSMEAR: NORMAL
NEUTROPHILS # BLD AUTO: 2.69 K/UL
NEUTROPHILS NFR BLD AUTO: 67.9 %
OVALOCYTES BLD QL SMEAR: SLIGHT
PLAT MORPH BLD: ABNORMAL
POIKILOCYTOSIS BLD QL SMEAR: SLIGHT
POTASSIUM SERPL-SCNC: 4.2 MMOL/L
PROT SERPL-MCNC: 6.4 G/DL
RBC BLD AUTO: ABNORMAL
SMUDGE CELLS # BLD: PRESENT
SODIUM SERPL-SCNC: 145 MMOL/L
SPHEROCYTES BLD QL SMEAR: SLIGHT
TIBC SERPL-MCNC: 278 UG/DL
UIBC SERPL-MCNC: 194 UG/DL
VIT B12 SERPL-MCNC: 907 PG/ML

## 2023-08-03 ENCOUNTER — APPOINTMENT (OUTPATIENT)
Dept: MRI IMAGING | Facility: CLINIC | Age: 73
End: 2023-08-03

## 2023-08-04 LAB
APTT HEX PL PPP: NEGATIVE
CONFIRM: 34.9 SEC
DRVVT IMM 1:2 NP PPP: NORMAL
DRVVT SCREEN TO CONFIRM RATIO: 0.83 RATIO
SCREEN DRVVT: 29 SEC

## 2023-08-07 ENCOUNTER — NON-APPOINTMENT (OUTPATIENT)
Age: 73
End: 2023-08-07

## 2023-08-08 ENCOUNTER — NON-APPOINTMENT (OUTPATIENT)
Age: 73
End: 2023-08-08

## 2023-08-09 ENCOUNTER — APPOINTMENT (OUTPATIENT)
Dept: OTOLARYNGOLOGY | Facility: CLINIC | Age: 73
End: 2023-08-09
Payer: MEDICARE

## 2023-08-09 VITALS — WEIGHT: 186 LBS | BODY MASS INDEX: 23.13 KG/M2 | HEIGHT: 75 IN

## 2023-08-09 DIAGNOSIS — H90.3 SENSORINEURAL HEARING LOSS, BILATERAL: ICD-10-CM

## 2023-08-09 DIAGNOSIS — H93.13 TINNITUS, BILATERAL: ICD-10-CM

## 2023-08-09 PROCEDURE — 92557 COMPREHENSIVE HEARING TEST: CPT

## 2023-08-09 PROCEDURE — 99213 OFFICE O/P EST LOW 20 MIN: CPT

## 2023-08-09 PROCEDURE — 92567 TYMPANOMETRY: CPT

## 2023-08-09 NOTE — HISTORY OF PRESENT ILLNESS
[de-identified] : YUE HENSON is a 73 year old patient here for follow up. He was seen last month for headaches.  He has seen the neurologist, Dr. Bassett, and is undergoing evaluation.  He had an MRI of the brain.  He has an MRA pending as there may be an aneurysm. He also has a history of hearing loss.  He said that he feels like there was a decrease in the hearing about 10 days ago.  He has tinnitus which has also gotten worse.  He has no otorrhea or dizziness.  He had an audiogram performed on August 3.  He is getting hearing aids.  I was able to review the audiogram.  There may be an asymmetry in the hearing.  However, there was no word recognition or tympanograms performed.  He had a hearing test in 2011 which showed a mild sensorineural hearing loss with asymmetry in the right ear at 2000 Hz.  ENT history No history of allergies or recurrent sinusitis CT scan of sinuses- no significant sinus inflammation.  Bilateral middle turbinate pieter bullosa, deviated septum, mild inferior maxillary sinus mucosal thickening and narrow but patent OMCs.  There was an area suspicious for Paget's disease involving the right temporal occipital calvarium  He has hearing loss.  He is a musician  NE/FL- DNS. no lesions

## 2023-08-09 NOTE — ASSESSMENT
[FreeTextEntry1] :  he has a history of right-sided headaches.  They are somewhat better.  He is seeing Dr. Bassett and undergoing evaluation.  He had an MRI of the brain.  MRA is pending.   He has bilateral sensorineural hearing loss.  There may be a small conductive component in both ears in one frequency.  There was no asymmetry.  He felt there was a change in his hearing about 10 days ago.   he has had a decline in his hearing since a prior audiogram in 2011.  It is unclear if he had a bilateral sudden sensorineural hearing loss since there was no testing prior to this month since 2011.   He also had difficulty hearing at his last visit.  Plan -Findings and management options were discussed with the patient. -good ear hygiene reviewed -monitor hearing -he is getting hearing aids.  -  If he did have a sudden sensorineural hearing loss, the treatment would be oral steroids or bilateral intratympanic steroid injections.  It is difficult to  know for certain if he did have a sudden hearing loss since we do not have an audiogram since 2011 to compare the more recent ones 2.  He may check with his doctors to see if it is possible for him to take oral steroids. -He will follow up with Dr. Bassett for the headaches.  - He will call me if he would like to try the oral steroids. - I will see him back in 1 to 2 months to recheck his hearing. -he should call and return earlier if he has any concerns or worsening symptoms

## 2023-08-10 DIAGNOSIS — I82.409 ACUTE EMBOLISM AND THROMBOSIS OF UNSPECIFIED DEEP VEINS OF UNSPECIFIED LOWER EXTREMITY: ICD-10-CM

## 2023-08-14 ENCOUNTER — NON-APPOINTMENT (OUTPATIENT)
Age: 73
End: 2023-08-14

## 2023-08-14 ENCOUNTER — APPOINTMENT (OUTPATIENT)
Dept: NEUROLOGY | Facility: CLINIC | Age: 73
End: 2023-08-14

## 2023-08-14 PROBLEM — H90.3 SENSORINEURAL HEARING LOSS (SNHL) OF BOTH EARS: Status: ACTIVE | Noted: 2023-07-19

## 2023-08-16 ENCOUNTER — APPOINTMENT (OUTPATIENT)
Dept: HEART AND VASCULAR | Facility: CLINIC | Age: 73
End: 2023-08-16

## 2023-08-17 ENCOUNTER — APPOINTMENT (OUTPATIENT)
Dept: CARDIOTHORACIC SURGERY | Facility: HOSPITAL | Age: 73
End: 2023-08-17

## 2023-08-18 ENCOUNTER — APPOINTMENT (OUTPATIENT)
Dept: OTOLARYNGOLOGY | Facility: CLINIC | Age: 73
End: 2023-08-18

## 2023-09-05 ENCOUNTER — NON-APPOINTMENT (OUTPATIENT)
Age: 73
End: 2023-09-05

## 2023-09-06 ENCOUNTER — APPOINTMENT (OUTPATIENT)
Dept: CARDIOTHORACIC SURGERY | Facility: CLINIC | Age: 73
End: 2023-09-06

## 2023-09-06 ENCOUNTER — APPOINTMENT (OUTPATIENT)
Dept: HEART AND VASCULAR | Facility: CLINIC | Age: 73
End: 2023-09-06

## 2023-09-27 ENCOUNTER — APPOINTMENT (OUTPATIENT)
Dept: HEART AND VASCULAR | Facility: CLINIC | Age: 73
End: 2023-09-27
Payer: MEDICARE

## 2023-09-27 ENCOUNTER — NON-APPOINTMENT (OUTPATIENT)
Age: 73
End: 2023-09-27

## 2023-09-27 VITALS
WEIGHT: 177 LBS | OXYGEN SATURATION: 98 % | TEMPERATURE: 97.8 F | SYSTOLIC BLOOD PRESSURE: 107 MMHG | HEART RATE: 51 BPM | BODY MASS INDEX: 22.01 KG/M2 | HEIGHT: 75 IN | DIASTOLIC BLOOD PRESSURE: 60 MMHG

## 2023-09-27 DIAGNOSIS — I71.9 AORTIC ANEURYSM OF UNSPECIFIED SITE, W/OUT RUPTURE: ICD-10-CM

## 2023-09-27 DIAGNOSIS — D68.51 ACTIVATED PROTEIN C RESISTANCE: ICD-10-CM

## 2023-09-27 DIAGNOSIS — K21.9 GASTRO-ESOPHAGEAL REFLUX DISEASE W/OUT ESOPHAGITIS: ICD-10-CM

## 2023-09-27 DIAGNOSIS — F41.9 ANXIETY DISORDER, UNSPECIFIED: ICD-10-CM

## 2023-09-27 DIAGNOSIS — E78.5 HYPERLIPIDEMIA, UNSPECIFIED: ICD-10-CM

## 2023-09-27 PROCEDURE — 99214 OFFICE O/P EST MOD 30 MIN: CPT | Mod: 25

## 2023-09-27 PROCEDURE — 93000 ELECTROCARDIOGRAM COMPLETE: CPT

## 2023-09-27 RX ORDER — ROSUVASTATIN CALCIUM 5 MG/1
5 TABLET, FILM COATED ORAL DAILY
Qty: 90 | Refills: 3 | Status: ACTIVE | COMMUNITY
Start: 2023-09-27

## 2023-11-11 ENCOUNTER — NON-APPOINTMENT (OUTPATIENT)
Age: 73
End: 2023-11-11

## 2023-11-13 ENCOUNTER — NON-APPOINTMENT (OUTPATIENT)
Age: 73
End: 2023-11-13

## 2023-12-20 NOTE — ED ADULT NURSE NOTE - NSFALLRSKOUTCOME_ED_ALL_ED
IC left voice message for pt about referral that was received for services and possible placement on the wait list.  2nd outreach attempt to pt.  
Universal Safety Interventions
51.4

## 2024-01-22 ENCOUNTER — NON-APPOINTMENT (OUTPATIENT)
Age: 74
End: 2024-01-22

## 2024-01-31 ENCOUNTER — APPOINTMENT (OUTPATIENT)
Dept: HEART AND VASCULAR | Facility: CLINIC | Age: 74
End: 2024-01-31
Payer: MEDICARE

## 2024-01-31 ENCOUNTER — NON-APPOINTMENT (OUTPATIENT)
Age: 74
End: 2024-01-31

## 2024-01-31 VITALS
BODY MASS INDEX: 20.89 KG/M2 | HEART RATE: 71 BPM | SYSTOLIC BLOOD PRESSURE: 133 MMHG | DIASTOLIC BLOOD PRESSURE: 76 MMHG | WEIGHT: 168 LBS | OXYGEN SATURATION: 100 % | HEIGHT: 75 IN

## 2024-01-31 PROCEDURE — 99214 OFFICE O/P EST MOD 30 MIN: CPT

## 2024-01-31 NOTE — DISCUSSION/SUMMARY
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  no AC, will review with Dr Martinez the risk benefit of the dvt due to factor V vs. potential rebleed with possible neuro amyloid    2.htn-well controlled, contineu metoprolol 25  q d   3.cad- non obst  4. chol- continue crestor

## 2024-01-31 NOTE — REASON FOR VISIT
[CV Risk Factors and Non-Cardiac Disease] : CV risk factors and non-cardiac disease [FreeTextEntry1] : 74 yo male with pmh sig for ascending aortic repair with valve replacement, complicated by delayed spontaneous subdural , dvt on xeralto, now with filter, post subdural evacuation.   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 possible Neuro amyloid

## 2024-02-19 NOTE — DISCUSSION/SUMMARY
[FreeTextEntry1] : All relevant risks and benefits discussed. Patient verbalizes understanding and agreement with plan.\par \par This is a telehealth encounter via secure, HIPAA-compliant platform with patient providing consent. All components of the evaluation and management were performed per clinical routine with the exception of the physical exam. The physical exam references my most recent assessment plus any additional information provided by the patient.
FAMILY HISTORY:  Family history of colon cancer in mother  Family history of pacemaker, (Pt with LVAD, Father alive)

## 2024-03-27 ENCOUNTER — APPOINTMENT (OUTPATIENT)
Dept: HEART AND VASCULAR | Facility: CLINIC | Age: 74
End: 2024-03-27

## 2024-04-11 ENCOUNTER — NON-APPOINTMENT (OUTPATIENT)
Age: 74
End: 2024-04-11

## 2024-05-08 NOTE — ASSESSMENT
Pt last seen on 3/5/24. Due to return in 3 months.    Has appt on 6/12/24.    Rx last filled on 4/17/24  3 mg #30/0RF.    Will forward to MD for refill. Was not sure if you plan to increase.   [FreeTextEntry1] : Patient is a 70 year old male with a history of aortic aneurysm, GERD, hyperlipidemia, prostate cancer in 2005, s/p surgical prostatectomy, Factor V Leiden mutation, and DVT of the left lower extremity, elevated homocysteine level, B12 deficiency who now presents for hematologic follow-up. Patient to continue  Lovenox. Recommended that patient see a vascular surgeon at [facility] and have a repeat Doppler of the LLE. He was also advised to inquire with his PCP about testing his bone density, as his long term use of Lovenox places him at higher risk of developing osteoporosis. He will continue taking sublingual B12. Will order CBC, CMP, D-Dimer, PT/INR, and APTT , B12 and folate. Patient advised to call next week to discuss results and next appointment date.

## 2024-06-18 ENCOUNTER — APPOINTMENT (OUTPATIENT)
Dept: VASCULAR SURGERY | Facility: CLINIC | Age: 74
End: 2024-06-18
Payer: MEDICARE

## 2024-06-18 VITALS
WEIGHT: 168 LBS | DIASTOLIC BLOOD PRESSURE: 70 MMHG | SYSTOLIC BLOOD PRESSURE: 124 MMHG | HEART RATE: 65 BPM | HEIGHT: 75 IN | BODY MASS INDEX: 20.89 KG/M2

## 2024-06-18 PROCEDURE — 99213 OFFICE O/P EST LOW 20 MIN: CPT

## 2024-06-18 RX ORDER — RAMELTEON 8 MG/1
8 TABLET ORAL
Refills: 0 | Status: DISCONTINUED | COMMUNITY
End: 2024-06-18

## 2024-06-18 RX ORDER — PANTOPRAZOLE 40 MG/1
40 TABLET, DELAYED RELEASE ORAL
Refills: 0 | Status: DISCONTINUED | COMMUNITY
End: 2024-06-18

## 2024-06-18 RX ORDER — EZETIMIBE 10 MG/1
10 TABLET ORAL
Refills: 0 | Status: ACTIVE | COMMUNITY

## 2024-06-18 RX ORDER — ENOXAPARIN SODIUM 150 MG/ML
150 INJECTION, SOLUTION SUBCUTANEOUS
Qty: 90 | Refills: 3 | Status: DISCONTINUED | COMMUNITY
Start: 2022-05-20 | End: 2024-06-18

## 2024-06-18 RX ORDER — ASPIRIN 81 MG/1
81 TABLET, COATED ORAL
Qty: 90 | Refills: 3 | Status: DISCONTINUED | COMMUNITY
Start: 2023-09-27 | End: 2024-06-18

## 2024-06-18 RX ORDER — CLONAZEPAM 0.5 MG/1
0.5 TABLET ORAL DAILY
Refills: 0 | Status: DISCONTINUED | COMMUNITY
End: 2024-06-18

## 2024-06-18 RX ORDER — ENOXAPARIN SODIUM 150 MG/ML
150 INJECTION, SOLUTION SUBCUTANEOUS
Qty: 90 | Refills: 3 | Status: DISCONTINUED | COMMUNITY
Start: 2023-08-10 | End: 2024-06-18

## 2024-06-19 NOTE — PHYSICAL EXAM
[Normal Thyroid] : the thyroid was normal [Normal Breath Sounds] : Normal breath sounds [Normal Heart Sounds] : normal heart sounds [Normal Rate and Rhythm] : normal rate and rhythm [2+] : left 2+ [Ankle Swelling (On Exam)] : present [Ankle Swelling On The Left] : of the left ankle [Ankle Swelling On The Right] : mild [No HSM] : no hepatosplenomegaly [No Rash or Lesion] : No rash or lesion [Alert] : alert [Calm] : calm [JVD] : no jugular venous distention  [Carotid Bruits] : no carotid bruits [Right Carotid Bruit] : no bruit heard over the right carotid [Left Carotid Bruit] : no bruit heard over the left carotid [Varicose Veins Of Lower Extremities] : not present [Abdomen Masses] : No abdominal masses [] : not present [Abdomen Tenderness] : ~T ~M No abdominal tenderness [Purpura] : no purpura  [Petechiae] : no petechiae [Skin Ulcer] : no ulcer [Skin Induration] : no induration [de-identified] : Well-nourished, NAD [de-identified] : NC/AT, anicteric [de-identified] : FROM throughout, strength 5/5x4, no palpable cords in LLE [de-identified] : Neurosensory grossly intact in LEs

## 2024-06-19 NOTE — HISTORY OF PRESENT ILLNESS
[FreeTextEntry1] : 73yoM w/previous h/o DVT in LLE 10y prior after frequent flights, followed by Dr. Lantigua for +Factor V leiden managed w/chronic anticoagulation w/Eliquis, returns today to discuss an IVC filter that was recently placed after an intracranial bleed.  Pt underwent aortic valve replacement and root repair w/Dr. Cobian at [facility] in November 2023, developed a severe headache in March 2023 which was diagnosed as an intracranial bleed.  Pt was admitted to [facility], Dieter was d/c'd and he underwent evacuation of intracranial hematoma.  As pt could not be safely anticoagulated, he underwent placement of an IVC filter.  Pt wishes to discuss options for his IVC filter.

## 2024-06-19 NOTE — ASSESSMENT
[FreeTextEntry1] : 73yoM w/previous h/o DVT in LLE 10y prior after frequent flights, followed by Dr. Lantigua for +Factor V leiden managed w/chronic anticoagulation w/Eliquis, returns today to discuss an IVC filter that was recently placed after an intracranial bleed.  Pt underwent aortic valve replacement and root repair w/Dr. Cobina at [facility] in November 2023, developed a severe headache in March 2023 which was diagnosed as an intracranial bleed.  Pt was admitted to [facility], Dieter was d/c'd and he underwent evacuation of intracranial hematoma.  As pt could not be safely anticoagulated, he underwent placement of an IVC filter.  Pt wishes to discuss options for his IVC filter.  LLE chronically edematous and limb girth discrepancy noted (L>>R).  As pt cannot be safely anticoagulated and is at increased risk of thrombosis due to his Factor V Leiden mutation, we would recommend he remain w/his IVD filter in place and consider removal only when he can be safely anticoagulated.  Recommend he continue regular exercise and adequate PO hydration to prevent thrombus formation and instructed him to contact us immediately if he develops increased edema in both LEs simultaneously.

## 2024-07-03 ENCOUNTER — NON-APPOINTMENT (OUTPATIENT)
Age: 74
End: 2024-07-03

## 2024-07-03 ENCOUNTER — APPOINTMENT (OUTPATIENT)
Dept: VASCULAR SURGERY | Facility: CLINIC | Age: 74
End: 2024-07-03
Payer: MEDICARE

## 2024-07-03 ENCOUNTER — APPOINTMENT (OUTPATIENT)
Dept: HEART AND VASCULAR | Facility: CLINIC | Age: 74
End: 2024-07-03
Payer: MEDICARE

## 2024-07-03 ENCOUNTER — APPOINTMENT (OUTPATIENT)
Dept: HEART AND VASCULAR | Facility: CLINIC | Age: 74
End: 2024-07-03

## 2024-07-03 VITALS
HEART RATE: 80 BPM | OXYGEN SATURATION: 99 % | HEIGHT: 75 IN | DIASTOLIC BLOOD PRESSURE: 80 MMHG | SYSTOLIC BLOOD PRESSURE: 130 MMHG | BODY MASS INDEX: 20.89 KG/M2 | WEIGHT: 168 LBS

## 2024-07-03 VITALS
SYSTOLIC BLOOD PRESSURE: 107 MMHG | HEART RATE: 72 BPM | HEIGHT: 75 IN | WEIGHT: 187 LBS | BODY MASS INDEX: 23.25 KG/M2 | DIASTOLIC BLOOD PRESSURE: 66 MMHG

## 2024-07-03 VITALS — BODY MASS INDEX: 23.37 KG/M2 | WEIGHT: 187 LBS

## 2024-07-03 DIAGNOSIS — I62.9 NONTRAUMATIC INTRACRANIAL HEMORRHAGE, UNSPECIFIED: ICD-10-CM

## 2024-07-03 DIAGNOSIS — I74.9 EMBOLISM AND THROMBOSIS OF UNSPECIFIED ARTERY: ICD-10-CM

## 2024-07-03 PROCEDURE — 99214 OFFICE O/P EST MOD 30 MIN: CPT

## 2024-07-03 PROCEDURE — G2211 COMPLEX E/M VISIT ADD ON: CPT

## 2024-07-03 PROCEDURE — 93926 LOWER EXTREMITY STUDY: CPT

## 2024-07-03 RX ORDER — OMEPRAZOLE 20 MG/1
20 CAPSULE, DELAYED RELEASE ORAL TWICE DAILY
Qty: 60 | Refills: 2 | Status: ACTIVE | COMMUNITY
Start: 2024-07-03

## 2024-07-31 ENCOUNTER — NON-APPOINTMENT (OUTPATIENT)
Age: 74
End: 2024-07-31

## 2024-07-31 ENCOUNTER — APPOINTMENT (OUTPATIENT)
Dept: HEART AND VASCULAR | Facility: CLINIC | Age: 74
End: 2024-07-31
Payer: MEDICARE

## 2024-07-31 VITALS
WEIGHT: 190 LBS | BODY MASS INDEX: 23.62 KG/M2 | HEIGHT: 75 IN | DIASTOLIC BLOOD PRESSURE: 75 MMHG | SYSTOLIC BLOOD PRESSURE: 116 MMHG | OXYGEN SATURATION: 98 % | HEART RATE: 69 BPM

## 2024-07-31 PROCEDURE — G2211 COMPLEX E/M VISIT ADD ON: CPT

## 2024-07-31 PROCEDURE — 99214 OFFICE O/P EST MOD 30 MIN: CPT

## 2024-07-31 RX ORDER — IBUPROFEN 400 MG
TABLET ORAL
Refills: 0 | Status: ACTIVE | COMMUNITY

## 2024-07-31 RX ORDER — ASPIRIN ENTERIC COATED TABLETS 81 MG 81 MG/1
81 TABLET, DELAYED RELEASE ORAL DAILY
Refills: 0 | Status: ACTIVE | COMMUNITY

## 2024-07-31 NOTE — DISCUSSION/SUMMARY
[FreeTextEntry1] : 1.  CVA/atrial fibrillation-unable to tolerate Zio patch post new CVA 5 weeks ago, we will refer him for loop recorder placement, factor V Leiden deficiency but not a candidate for anticoagulation as he has had high risk of bleeding including subdural requiring surgical evacuation  2.  PFO-we will get an echocardiogram with a bubble study to confirm the presence of a PFO, if he does have a PFO with recurrent CVAs I recommended that he consider closure even at his age of 73  3.  Hyperlipidemia-continue his Crestor  4.  Hypertension well-controlled continue his enalapril 5.  Heme-factor V Leiden deficiency, being followed by stephanie, complicated case as he has had significant bleeding on anticoagulants and is not resistant is resistant to Coumadin

## 2024-07-31 NOTE — REASON FOR VISIT
[Coronary Artery Disease] : coronary artery disease [CV Risk Factors and Non-Cardiac Disease] : CV risk factors and non-cardiac disease [FreeTextEntry1] : 74-year-old male with a past medical history significant significant for ascending aorta with aortic valve repair at [facility] in November 2023, history of factor V Leiden deficiency, resistant to Coumadin, history of subdural hematoma requiring surgical decompression in 6 months after his aortic valve surgery at [place] in the setting of Lovenox, post IVC filter placement in light of the fact that he cannot be on anticoagulation due to his high risk of bleeding, new PVD with left lower extremity claudications being followed by Dr. Stroud   5 weeks ago he presented to the emergency room with new left-sided weakness he had an MRI which revealed new TIA/CVA.  He was placed on a 2-week event monitor which she could not tolerate and self discontinued  From a cardiovascular standpoint he is feeling well he is not having any chest pain or shortness of breath, he has regained his baseline presurgical weight

## 2024-07-31 NOTE — HISTORY OF PRESENT ILLNESS
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 He also went to the ED 5 weeks ago for arm weakness and was found to have a small stroke. He was setup with an event monitor for 2 weeks and was not able to tolerate it.

## 2024-08-09 ENCOUNTER — APPOINTMENT (OUTPATIENT)
Dept: HEART AND VASCULAR | Facility: CLINIC | Age: 74
End: 2024-08-09

## 2024-08-09 ENCOUNTER — NON-APPOINTMENT (OUTPATIENT)
Age: 74
End: 2024-08-09

## 2024-08-09 PROCEDURE — 99204 OFFICE O/P NEW MOD 45 MIN: CPT | Mod: 25

## 2024-08-09 PROCEDURE — 93000 ELECTROCARDIOGRAM COMPLETE: CPT

## 2024-08-09 NOTE — DISCUSSION/SUMMARY
[FreeTextEntry1] : 74-year-old male with a past medical history significant significant for ascending aorta with aortic valve repair at [facility] in August 2023, cerebral hemorrhage in 2023 requiring surgical decompression, hx of factor V Leiden deficiency, resistant to coumadin, frequent TIAs/CVAs, who presents for discussion of ILR placement.   1) ILR placement  - Given patient has a history of frequent TIA/strokes w/o known cause, patient would be candidate for ILR to continuously monitor his heart for atrial arrythmias and need for a/c.  - Patient would like to proceed with procedure.  - We discussed that there are various etiologies for strokes; one of which is a heart rhythm abnormality.  We discussed the normal conduction system of the heart and what atrial fibrillation is, how it can be paroxysmal and the association with stroke.  We also spoke about how many patients are asymptomatic and therefore long-term heart rhythm monitoring is warranted to look for silent atrial fibrillation. Options for long term arrhythmia surveillance include regular EKGs, wearable technology, event monitors or a loop recorder implant which is the most thorough way to monitor this.  We discussed how remote monitoring works and the limitations and benefits of this technology.  We will contact him if he has any arrhythmias that can cause a stroke and then discuss with neurology if we should adjust his medications to add a NOAC.  However, if he has any concerning symptoms (increased fatigue, fast heart rates/palpitations, LANDRY) he will contact us to review his monitor remotely.  I also informed him how remote monitoring billing works.   We discussed the procedure in detail including risks, benefits, and alternatives.    After consideration of this information, the decision was made to proceed with device implantation.   appeared to understand the whole discussion and verbalized that all his questions were answered to his satisfaction.  He will proceed with ILR placement at the [facility] location.    [EKG obtained to assist in diagnosis and management of assessed problem(s)] : EKG obtained to assist in diagnosis and management of assessed problem(s)

## 2024-08-09 NOTE — DISCUSSION/SUMMARY
[FreeTextEntry1] : 74-year-old male with a past medical history significant significant for ascending aorta with aortic valve repair at [place] in August 2023, cerebral hemorrhage in 2023 requiring surgical decompression, hx of factor V Leiden deficiency, resistant to coumadin, frequent TIAs/CVAs, who presents for discussion of ILR placement.   1) ILR placement  - Given patient has a history of frequent TIA/strokes w/o known cause, patient would be candidate for ILR to continuously monitor his heart for atrial arrythmias and need for a/c.  - Patient would like to proceed with procedure.  - We discussed that there are various etiologies for strokes; one of which is a heart rhythm abnormality.  We discussed the normal conduction system of the heart and what atrial fibrillation is, how it can be paroxysmal and the association with stroke.  We also spoke about how many patients are asymptomatic and therefore long-term heart rhythm monitoring is warranted to look for silent atrial fibrillation. Options for long term arrhythmia surveillance include regular EKGs, wearable technology, event monitors or a loop recorder implant which is the most thorough way to monitor this.  We discussed how remote monitoring works and the limitations and benefits of this technology.  We will contact him if he has any arrhythmias that can cause a stroke and then discuss with neurology if we should adjust his medications to add a NOAC.  However, if he has any concerning symptoms (increased fatigue, fast heart rates/palpitations, LANDRY) he will contact us to review his monitor remotely.  I also informed him how remote monitoring billing works.   We discussed the procedure in detail including risks, benefits, and alternatives.    After consideration of this information, the decision was made to proceed with device implantation.   appeared to understand the whole discussion and verbalized that all his questions were answered to his satisfaction.  He will proceed with ILR placement at the [facility] location.    [EKG obtained to assist in diagnosis and management of assessed problem(s)] : EKG obtained to assist in diagnosis and management of assessed problem(s)

## 2024-08-09 NOTE — HISTORY OF PRESENT ILLNESS
[FreeTextEntry1] : Mr. Anne is a 74-year-old male with a past medical history significant significant for ascending aorta with aortic valve repair at [place] in August 2023 then had a cerebral hemorrhage in November 2023, history of factor V Leiden deficiency, resistant to Coumadin, history of subdural hematoma requiring surgical decompression in 6 months after his aortic valve surgery at [place] in the setting of Lovenox, post IVC filter placement in light of the fact that he cannot be on anticoagulation due to his high risk of bleeding, new PVD with left lower extremity claudication being followed by Dr. Stroud, who presents for initial evaluation from Dr. Salter. He states that 5 weeks ago, he presented to the ED with new left-sided weakness he had an MRI which revealed new TIA/CVA. He was placed on a 2-week event monitor which she could not tolerate due to rash and self discontinued. From the 2 weeks, no arrythmias noted. From a cardiovascular standpoint, he is feeling well without any chest pain or SOB. He presents for possible ILR placement planning.

## 2024-08-09 NOTE — HISTORY OF PRESENT ILLNESS
[FreeTextEntry1] : Mr. Anne is a 74-year-old male with a past medical history significant significant for ascending aorta with aortic valve repair at [place] in August 2023 then had a cerebral hemorrhage in November 2023, history of factor V Leiden deficiency, resistant to Coumadin, history of subdural hematoma requiring surgical decompression in 6 months after his aortic valve surgery at [place] in the setting of Lovenox, post IVC filter placement in light of the fact that he cannot be on anticoagulation due to his high risk of bleeding, new PVD with left lower extremity claudication being followed by Dr. Stroud, who presents for initial evaluation from Dr. Salter. He states that 5 weeks ago, he presented to the ED with new left-sided weakness he had an MRI which revealed new TIA/CVA. He was placed on a 2-week event monitor which she could not tolerate due to rash and self discontinued. From the 2 weeks, no arrythmias noted. From a cardiovascular standpoint, he is feeling well without any chest pain or SOB. He presents for possible ILR placement planning.

## 2024-08-09 NOTE — ADDENDUM
[FreeTextEntry1] :   I, Ramya Ogden, am scribing for and the presence of Dr. Flavia Kramer the following sections: HPI, PMH,Family/social history, ROS, Physical Exam, Assessment / Plan.  I, Flavia Kramer, hereby attest that the medical record entry for this patient accurately reflects signatures/notations that I made on the Date of Service in my capacity as an Attending Physician when I treated/diagnosed the above patient. I do hereby attest that this information is true, accurate and complete to the best of my knowledge.  I was present for the entire visit and supervised the entire visit including assessing the patient, conducting exam and establishing the plan of care.  I personally performed the evaluation and management services and agree with the plan as outlined above.

## 2024-08-19 DIAGNOSIS — Z86.73 PERSONAL HISTORY OF TRANSIENT ISCHEMIC ATTACK (TIA), AND CEREBRAL INFARCTION W/OUT RESIDUAL DEFICITS: ICD-10-CM

## 2024-08-26 NOTE — PROCEDURE
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

## 2024-08-27 ENCOUNTER — APPOINTMENT (OUTPATIENT)
Dept: VASCULAR SURGERY | Facility: CLINIC | Age: 74
End: 2024-08-27
Payer: MEDICARE

## 2024-08-27 VITALS
SYSTOLIC BLOOD PRESSURE: 118 MMHG | HEART RATE: 73 BPM | DIASTOLIC BLOOD PRESSURE: 65 MMHG | WEIGHT: 191 LBS | HEIGHT: 75 IN | BODY MASS INDEX: 23.75 KG/M2

## 2024-08-27 PROCEDURE — 99213 OFFICE O/P EST LOW 20 MIN: CPT

## 2024-08-28 NOTE — PHYSICAL EXAM
[Normal Thyroid] : the thyroid was normal [Normal Breath Sounds] : Normal breath sounds [Normal Heart Sounds] : normal heart sounds [Normal Rate and Rhythm] : normal rate and rhythm [2+] : left 2+ [Ankle Swelling (On Exam)] : present [Ankle Swelling On The Left] : of the left ankle [Ankle Swelling On The Right] : mild [No HSM] : no hepatosplenomegaly [No Rash or Lesion] : No rash or lesion [Alert] : alert [Calm] : calm [JVD] : no jugular venous distention  [Carotid Bruits] : no carotid bruits [Right Carotid Bruit] : no bruit heard over the right carotid [Left Carotid Bruit] : no bruit heard over the left carotid [Varicose Veins Of Lower Extremities] : not present [] : not present [Abdomen Masses] : No abdominal masses [Abdomen Tenderness] : ~T ~M No abdominal tenderness [Purpura] : no purpura  [Petechiae] : no petechiae [Skin Ulcer] : no ulcer [Skin Induration] : no induration [de-identified] : Well-nourished, NAD [de-identified] : NC/AT, anicteric [de-identified] : FROM throughout, strength 5/5x4, no palpable cords in LLE [de-identified] : Neurosensory grossly intact in LEs

## 2024-08-28 NOTE — HISTORY OF PRESENT ILLNESS
[FreeTextEntry1] : 74yoM w/previous h/o DVT in LLE 10y prior after frequent flights, followed by Dr. Lantigua for +Factor V leiden managed w/chronic anticoagulation w/Eliquis, returns for reevaluation of an acute-onset L popliteal artery occlusion in the LLE, no flow in the L GIDEON/peroneal arteries on duplex.  Pt has been stable on Eliquis and reports that his 3-4 block claudication is now improved to 5-6 blocks since starting PT; pt denies rest pain or tissue loss of the LEs.

## 2024-08-28 NOTE — ASSESSMENT
[FreeTextEntry1] : 74yoM w/previous h/o DVT in LLE 10y prior after frequent flights, followed by Dr. Lantigua for +Factor V leiden managed w/chronic anticoagulation w/Eliquis, returns for reevaluation of an acute-onset L popliteal artery occlusion in the LLE, no flow in the L GIDEON/peroneal arteries on duplex.  Pt has been stable on Eliquis and reports that his 3-4 block claudication is now improved to 5-6 blocks since starting PT; pt denies rest pain or tissue loss of the LEs.  LE perfusion adequate w/brisk cap refill, warm extremity, no neurologic deficits in the LE.  Previous bilateral LE arterial duplex performed today demonstrates fresh popliteal artery occlusion in the LLE, no flow in the L GIDEON/peroneal arteries, limited/low flow in the R pop artery; LLE venous duplex demonstrates no acute SVT/DVT.  Encouraged pt to continue ASA and an aggressive exercise regimen, recommend he continue PT for at least another 8 sessions.  Pt will RTO PRN worsening/new symptoms in the legs.

## 2024-08-28 NOTE — ADDENDUM
[FreeTextEntry1] : This note was written by Venu Reddy, acting as a scribe for Dr. Annika Yo.  I, Dr. Annika Yo, have read and attest that all the information, medical decision-making, and discharge instructions within are true and accurate.  I, Dr. Annika Yo, personally performed the evaluation and management (E/M) services for this established patient who presents today with (an) existing condition(s).  That E/M includes conducting the examination, assessing all conditions, and (re)establishing/reinforcing a plan of care.  Today, my ACP, Venu Reddy, was here to observe my evaluation and management services for this condition to be followed going forward.

## 2024-08-28 NOTE — DATA REVIEWED
[No studies available for review at this time.] : No studies available for review at this time. [FreeTextEntry1] : Previous bilateral LE arterial duplex performed today demonstrates fresh popliteal artery occlusion in the LLE, no flow in the L GIDEON/peroneal arteries, limited/low flow in the R pop artery; LLE venous duplex demonstrates no acute SVT/DVT.

## 2024-08-28 NOTE — PHYSICAL EXAM
[Normal Thyroid] : the thyroid was normal [Normal Breath Sounds] : Normal breath sounds [Normal Heart Sounds] : normal heart sounds [Normal Rate and Rhythm] : normal rate and rhythm [2+] : left 2+ [Ankle Swelling (On Exam)] : present [Ankle Swelling On The Left] : of the left ankle [Ankle Swelling On The Right] : mild [No HSM] : no hepatosplenomegaly [No Rash or Lesion] : No rash or lesion [Alert] : alert [Calm] : calm [JVD] : no jugular venous distention  [Carotid Bruits] : no carotid bruits [Right Carotid Bruit] : no bruit heard over the right carotid [Left Carotid Bruit] : no bruit heard over the left carotid [Varicose Veins Of Lower Extremities] : not present [] : not present [Abdomen Masses] : No abdominal masses [Abdomen Tenderness] : ~T ~M No abdominal tenderness [Purpura] : no purpura  [Petechiae] : no petechiae [Skin Ulcer] : no ulcer [Skin Induration] : no induration [de-identified] : Well-nourished, NAD [de-identified] : NC/AT, anicteric [de-identified] : FROM throughout, strength 5/5x4, no palpable cords in LLE [de-identified] : Neurosensory grossly intact in LEs

## 2024-11-01 ENCOUNTER — APPOINTMENT (OUTPATIENT)
Dept: HEART AND VASCULAR | Facility: CLINIC | Age: 74
End: 2024-11-01
Payer: MEDICARE

## 2024-11-01 ENCOUNTER — APPOINTMENT (OUTPATIENT)
Age: 74
End: 2024-11-01

## 2024-11-01 PROCEDURE — 93000 ELECTROCARDIOGRAM COMPLETE: CPT

## 2024-11-01 PROCEDURE — 36415 COLL VENOUS BLD VENIPUNCTURE: CPT

## 2024-11-15 ENCOUNTER — OUTPATIENT (OUTPATIENT)
Dept: OUTPATIENT SERVICES | Facility: HOSPITAL | Age: 74
LOS: 1 days | Discharge: HOME | End: 2024-11-15

## 2024-11-15 PROCEDURE — 33285 INSJ SUBQ CAR RHYTHM MNTR: CPT

## 2024-11-15 PROCEDURE — C1764: CPT

## 2024-11-15 NOTE — PROGRESS NOTE ADULT - SUBJECTIVE AND OBJECTIVE BOX
EPS Progress Note    S: 74-year-old male with a past medical history significant significant for ascending aorta with aortic valve repair at [place] in August 2023 then had a cerebral hemorrhage in November 2023, history of factor V Leiden deficiency, resistant to Coumadin, history of subdural hematoma requiring surgical decompression in 6 months after his aortic valve surgery at [place] [facility] in the setting of Lovenox, post IVC filter placement in light of the fact that he cannot be on anticoagulation due to his high risk of bleeding, new PVD with left lower extremity claudication being followed by Dr. Stroud, who presents for initial evaluation from Dr. Salter. He states that 5 weeks ago, he presented to the ED with new left-sided weakness he had an MRI which revealed new TIA/CVA. He was placed on a 2-week event monitor which she could not tolerate due to rash and self discontinued. From the 2 weeks, no arrythmias noted. He presents today for ILR placement for long term arrythmia surveillance    ?     MEDICATIONS  (STANDING):  ? Aspirin 81 MG Oral Tablet Delayed Release; TAKE 1 TABLET DAILY AS DIRECTED  ?? Ezetimibe 10 MG Oral Tablet; TAKE 1 TABLET AT BEDTIME  ?? Folic Acid-Vit B6-Vit B12 TABS  ?? Omeprazole 20 MG Oral Capsule Delayed Release; TAKE 1 CAPSULE TWICE DAILY  ?? Rosuvastatin Calcium 20 MG Oral Tablet; TAKE 1 TABLET DAILY  ?? Zoloft 50 MG Oral Tablet; TAKE 1 TABLET DAILY           General:  NAD        HEENT:   PERRL, EOMI	  Neck: Supple, - JVD  Cardiovascular: S1 S2, No JVD,  Respiratory: CTA B/L       Gastrointestinal:  Soft, Non-tender, + BS	  Extremities: No edema  Psychiatry: A & O x 3	         Labs:                                                     Assessment/Plan:     74-year-old male with a past medical history significant significant for ascending aorta with aortic valve repair at [place] [facility] in August 2023 then had a cerebral hemorrhage in November 2023, history of factor V Leiden deficiency, resistant to Coumadin, history of subdural hematoma requiring surgical decompression in 6 months after his aortic valve surgery at [facility] [facility] in the setting of Lovenox, post IVC filter placement in light of the fact that he cannot be on anticoagulation due to his high risk of bleeding, new PVD with left lower extremity claudication being followed by Dr. Stroud, who presents for initial evaluation from Dr. Salter. He states that 5 weeks ago, he presented to the ED with new left-sided weakness he had an MRI which revealed new TIA/CVA. He was placed on a 2-week event monitor which she could not tolerate due to rash and self discontinued. From the 2 weeks, no arrythmias noted. He presents today for ILR placement for long term arrythmia surveillance    will discharge home today. ?

## 2024-11-19 ENCOUNTER — APPOINTMENT (OUTPATIENT)
Dept: VASCULAR SURGERY | Facility: CLINIC | Age: 74
End: 2024-11-19
Payer: MEDICARE

## 2024-11-19 ENCOUNTER — RESULT REVIEW (OUTPATIENT)
Age: 74
End: 2024-11-19

## 2024-11-19 ENCOUNTER — OUTPATIENT (OUTPATIENT)
Dept: OUTPATIENT SERVICES | Facility: HOSPITAL | Age: 74
LOS: 1 days | End: 2024-11-19
Payer: MEDICARE

## 2024-11-19 VITALS
HEART RATE: 69 BPM | DIASTOLIC BLOOD PRESSURE: 66 MMHG | WEIGHT: 191 LBS | BODY MASS INDEX: 23.75 KG/M2 | SYSTOLIC BLOOD PRESSURE: 113 MMHG | HEIGHT: 75 IN

## 2024-11-19 DIAGNOSIS — I87.1 COMPRESSION OF VEIN: ICD-10-CM

## 2024-11-19 DIAGNOSIS — D68.59 OTHER PRIMARY THROMBOPHILIA: ICD-10-CM

## 2024-11-19 PROCEDURE — 76376 3D RENDER W/INTRP POSTPROCES: CPT | Mod: 26

## 2024-11-19 PROCEDURE — 93306 TTE W/DOPPLER COMPLETE: CPT

## 2024-11-19 PROCEDURE — 93306 TTE W/DOPPLER COMPLETE: CPT | Mod: 26

## 2024-11-19 PROCEDURE — 99213 OFFICE O/P EST LOW 20 MIN: CPT

## 2024-11-19 PROCEDURE — 93923 UPR/LXTR ART STDY 3+ LVLS: CPT

## 2024-11-25 ENCOUNTER — NON-APPOINTMENT (OUTPATIENT)
Age: 74
End: 2024-11-25

## 2024-11-25 ENCOUNTER — APPOINTMENT (OUTPATIENT)
Dept: HEART AND VASCULAR | Facility: CLINIC | Age: 74
End: 2024-11-25
Payer: MEDICARE

## 2024-11-25 VITALS
DIASTOLIC BLOOD PRESSURE: 68 MMHG | BODY MASS INDEX: 23.75 KG/M2 | TEMPERATURE: 97.7 F | HEART RATE: 80 BPM | HEIGHT: 75 IN | SYSTOLIC BLOOD PRESSURE: 110 MMHG | OXYGEN SATURATION: 94 % | WEIGHT: 191 LBS

## 2024-11-25 PROCEDURE — 93285 PRGRMG DEV EVAL SCRMS IP: CPT

## 2024-12-30 ENCOUNTER — APPOINTMENT (OUTPATIENT)
Dept: HEART AND VASCULAR | Facility: CLINIC | Age: 74
End: 2024-12-30

## 2024-12-30 PROCEDURE — 93298 REM INTERROG DEV EVAL SCRMS: CPT

## 2025-01-03 ENCOUNTER — APPOINTMENT (OUTPATIENT)
Age: 75
End: 2025-01-03

## 2025-02-03 ENCOUNTER — APPOINTMENT (OUTPATIENT)
Dept: HEART AND VASCULAR | Facility: CLINIC | Age: 75
End: 2025-02-03

## 2025-02-06 NOTE — REVIEW OF SYSTEMS
[facility]... [Negative] : Allergic/Immunologic [Night Sweats] : no night sweats [Fatigue] : no fatigue [Recent Change In Weight] : ~T no recent weight change [Chest Pain] : no chest pain [Palpitations] : no palpitations [Shortness Of Breath] : no shortness of breath [Abdominal Pain] : no abdominal pain [Joint Pain] : no joint pain [Skin Rash] : no skin rash [Easy Bleeding] : no tendency for easy bleeding [Easy Bruising] : no tendency for easy bruising

## 2025-03-06 ENCOUNTER — APPOINTMENT (OUTPATIENT)
Dept: HEART AND VASCULAR | Facility: CLINIC | Age: 75
End: 2025-03-06